# Patient Record
Sex: MALE | Race: WHITE | Employment: OTHER | URBAN - METROPOLITAN AREA
[De-identification: names, ages, dates, MRNs, and addresses within clinical notes are randomized per-mention and may not be internally consistent; named-entity substitution may affect disease eponyms.]

---

## 2017-07-07 DIAGNOSIS — M65.849: Primary | ICD-10-CM

## 2017-07-07 DIAGNOSIS — M65.839: Primary | ICD-10-CM

## 2017-07-19 ENCOUNTER — CLINICAL SUPPORT (OUTPATIENT)
Dept: REHABILITATION | Facility: HOSPITAL | Age: 37
End: 2017-07-19
Attending: ORTHOPAEDIC SURGERY
Payer: MEDICAID

## 2017-07-19 DIAGNOSIS — R52 PAIN: ICD-10-CM

## 2017-07-19 PROCEDURE — 97161 PT EVAL LOW COMPLEX 20 MIN: CPT | Performed by: PHYSICAL THERAPIST

## 2017-07-19 PROCEDURE — 97140 MANUAL THERAPY 1/> REGIONS: CPT | Performed by: PHYSICAL THERAPIST

## 2017-07-19 NOTE — PROGRESS NOTES
Physician:Caleb Trevino III*  Diagnosis: B forearm pain  Encounter Diagnosis   Name Primary?   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  He rates pain as 0/10 presently, 5/10 with activity.  States no prior treatment.    Chief complaint:  pain  Radicular symptoms:  none  Aggravating factors:   Gripping things  Easing factors:  rest     Current functional status:   Independent with ADL    Patients structured exercise routine:    none  Exercise routine prior to onset :     Wt-lifting    Special tests:    MRI:    none  Xray:    neg    Past treatment includes:  accupuncture    Work:     lucero                             Pts goals:  Decrease pain; full return to wt-lifting    OBJECTIVE:  Postural examination:  Good sitting posture     Functional assessment:   - walking:   independent             AROM:  WNL     MMT:   4+/5 wrist flexors, 5/5 others B elbow/wrist    Tone:  normal    Flexibility testing:   Tight wrist flexors    Special tests:   Neg elbow varus/valgus stresses    Palpation:   TTP B wrist flexors    Joint mobility: good    Swelling:  none    Other: sensation intact to light touch      Patient History Examination Clinical Presentation Clinical Decision Making   Comorbidities:      Personal Factors:         Activity and Participation Restriction:      Body Systems:  musculoskeletal      Body Regions:  B forearm Stable and uncomplicated     Low        FOTO:  40% limitation         TREATMENT:    Today's treatment:  HP B forearms x 10 min, manual wrist flex/ext stretch, STM with roller B wrist flexors x 3 min, HEP and DN B FCRL.    Pt was provided with a written copy of exercises to perform as tolerated, including: wrist flex/ext stretch and theraband wrist flex/ext/RD.    Exercises were reviewed and pt was able to  demonstrate them prior to the end of the session.     Pt was provided educational information, including: icing for pain.    Discussed insurance limitations with pt.     ASSESSMENT:  PT diagnosis: B wrist flexor strains   Patient can benefit from outpatient physical therapy and a home program  Prognosis is Good.    No cultural, environmental or spiritual barriers identified to treatment or learning.     Medical necessity is demonstrated by the following  IMPAIRMENTS:  Pain limits function of effected part for some activities    GOALS:    4_   weeks. Pt agrees with goals set.  1. Independent with HEP.  2. Report decreased    B forearm    pain  <   / =  3/10 with adls such as lifting  3. Increased MMT  for  B forearm to 5/5 with ADL    4.     PLAN:  Outpatient physical therapy 1-2 times weekly to include: pt ed, hep, therapeutic exercises, neuromuscular re-education/ balance exercises, joint mobilizations, modalities prn, and aquatic therapy.    Cont PT for  4         weeks.   I certify the need for these services   furnished under this plan of treatment and while under my   care.____________________________________ Physician/Referring Practitioner Date   of Signature

## 2017-08-02 ENCOUNTER — CLINICAL SUPPORT (OUTPATIENT)
Dept: REHABILITATION | Facility: HOSPITAL | Age: 37
End: 2017-08-02
Attending: ORTHOPAEDIC SURGERY
Payer: MEDICAID

## 2017-08-02 DIAGNOSIS — R52 PAIN: Primary | ICD-10-CM

## 2017-08-02 PROCEDURE — 97140 MANUAL THERAPY 1/> REGIONS: CPT | Performed by: PHYSICAL THERAPIST

## 2017-08-02 PROCEDURE — 97110 THERAPEUTIC EXERCISES: CPT | Performed by: PHYSICAL THERAPIST

## 2017-08-02 NOTE — PROGRESS NOTES
" Outpatient Physical Therapy Progress Note     Time in: 3:30  Time out: 4:00  Ther ex time: 15 min    Visit # 2    Subjective:  Pt states forearms feel "better overall" and rates pain as 0/10 presently.  States doing HEP as instructed.    Objective:   Mild TTP B wrist flexors.    Treatment:  There ex and modalities for increased ROM/strength and improved ADL to include:  HP x 10 min B elbows, manual wrist flexor/extensor stretch, STM with roller B wrist flexors, DN B FCRL and HEP review.     Assessment:   Sx decreased.  No TTP after DN.     Will the patient continue to benefit from skilled PT intervention?   yes      Medical necessity is demonstrated by:   Pain limits function of effected part for all activities  Unable to participate fully in daily activities      Progress towards goals: fair    New/Revised Goals:    Plan:  Continue with established Plan of Care toward PT goals.        "

## 2025-06-18 ENCOUNTER — OFFICE VISIT (OUTPATIENT)
Dept: SPORTS MEDICINE | Facility: CLINIC | Age: 45
End: 2025-06-18
Payer: MEDICAID

## 2025-06-18 ENCOUNTER — HOSPITAL ENCOUNTER (OUTPATIENT)
Dept: RADIOLOGY | Facility: HOSPITAL | Age: 45
Discharge: HOME | End: 2025-06-18
Attending: PHYSICIAN ASSISTANT
Payer: MEDICAID

## 2025-06-18 VITALS
DIASTOLIC BLOOD PRESSURE: 76 MMHG | HEIGHT: 73 IN | BODY MASS INDEX: 26.19 KG/M2 | SYSTOLIC BLOOD PRESSURE: 124 MMHG | HEART RATE: 64 BPM | WEIGHT: 197.63 LBS

## 2025-06-18 DIAGNOSIS — M25.562 LEFT KNEE PAIN, UNSPECIFIED CHRONICITY: ICD-10-CM

## 2025-06-18 DIAGNOSIS — M23.92 INTERNAL DERANGEMENT OF LEFT KNEE: Primary | ICD-10-CM

## 2025-06-18 PROCEDURE — 99203 OFFICE O/P NEW LOW 30 MIN: CPT | Mod: PBBFAC,25 | Performed by: PHYSICIAN ASSISTANT

## 2025-06-18 PROCEDURE — 3008F BODY MASS INDEX DOCD: CPT | Mod: CPTII,,, | Performed by: PHYSICIAN ASSISTANT

## 2025-06-18 PROCEDURE — 99204 OFFICE O/P NEW MOD 45 MIN: CPT | Mod: S$PBB,,, | Performed by: PHYSICIAN ASSISTANT

## 2025-06-18 PROCEDURE — 99999 PR PBB SHADOW E&M-NEW PATIENT-LVL III: CPT | Mod: PBBFAC,,, | Performed by: PHYSICIAN ASSISTANT

## 2025-06-18 PROCEDURE — 1160F RVW MEDS BY RX/DR IN RCRD: CPT | Mod: CPTII,,, | Performed by: PHYSICIAN ASSISTANT

## 2025-06-18 PROCEDURE — 73564 X-RAY EXAM KNEE 4 OR MORE: CPT | Mod: TC,50

## 2025-06-18 PROCEDURE — 73564 X-RAY EXAM KNEE 4 OR MORE: CPT | Mod: 26,50,, | Performed by: RADIOLOGY

## 2025-06-18 PROCEDURE — 3074F SYST BP LT 130 MM HG: CPT | Mod: CPTII,,, | Performed by: PHYSICIAN ASSISTANT

## 2025-06-18 PROCEDURE — 3078F DIAST BP <80 MM HG: CPT | Mod: CPTII,,, | Performed by: PHYSICIAN ASSISTANT

## 2025-06-18 PROCEDURE — 1159F MED LIST DOCD IN RCRD: CPT | Mod: CPTII,,, | Performed by: PHYSICIAN ASSISTANT

## 2025-06-18 RX ORDER — MELOXICAM 15 MG/1
15 TABLET ORAL DAILY
Qty: 30 TABLET | Refills: 2 | Status: SHIPPED | OUTPATIENT
Start: 2025-06-18

## 2025-06-18 NOTE — PROGRESS NOTES
Subjective:     Chief Complaint: Enoc Rodney is a 44 y.o. male who had concerns including Pain of the Left Knee and Pain.    History of Present Illness    CHIEF COMPLAINT:  - Left knee pain    HPI:  Enoc presents with left knee pain that started approximately two weeks ago following a twisting injury. He felt a pop in his knee, which he describes as a sudden, intense sensation. Pain is primarily on the inner (medial) side of the knee. Initially, he had minimal discomfort, but pain increased after returning from a vacation to [place]. He reports difficulty moving his leg after sitting for prolonged periods, experiencing pain when initiating movement. He has significant locking catching sensations in the knee especially sleeping.    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 Since the injury, he has avoided leg exercises but has continued upper body workouts. He was able to swim in the ocean during his vacation, using a dolphin kick to avoid aggravating his knee. To manage his pain, he has been using naproxen and was given toradol by a friend, which he reports provided some relief. He visited his chiropractor who has noam facilitating his therapy exercises but pain worse.     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 He denies any formal medical diagnoses.    PREVIOUS TREATMENTS:  - Enoc took naproxen for knee pain  - Enoc took toradol for knee pain  - Enoc has been to the gym twice to do upper body exercises since the injury  - Enoc swam a little bit in the ocean during vacation, using dolphin kick but avoiding breaststroke, pain with swimming      MEDICATIONS:  - Meloxicam: 15 mg 1 tablet daily (prescribed in 2013, available for current use if needed)  - Naproxen  - Toradol    WORK STATUS:  - Enoc works in construction, effecting work  - Job involves frequent squatting  - Enoc reports  some knee pain during work activities, ADLs      ROS:  General: negative fever, negative chills, negative fatigue, negative weight gain, negative weight loss  Eyes: negative vision changes, negative redness, negative discharge  ENT: negative ear pain, negative nasal congestion, negative sore throat  Cardiovascular: negative chest pain, negative palpitations, negative lower extremity edema  Respiratory: negative cough, negative shortness of breath  Gastrointestinal: negative abdominal pain, negative nausea, negative vomiting, negative diarrhea, negative constipation, negative blood in stool  Genitourinary: negative dysuria, negative hematuria, negative frequency  Musculoskeletal: +joint pain, negative muscle pain, +joint stiffness, +pain with movement  Skin: negative rash, negative lesion  Neurological: negative headache, negative dizziness, negative numbness, negative tingling  Psychiatric: negative anxiety, negative depression, negative sleep difficulty           Pain Related Questions  Over the past 3 days, what was your average pain during activity? (I.e. running, jogging, walking, climbing stairs, getting dressed, ect.): 10  Over the past 3 days, what was your highest pain level?: 10  Over the past 3 days, what was your lowest pain level? : 4    Other  How many nights a week are you awakened by your affected body part?: 3  Was the patient's HEIGHT measured or patient reported?: Patient Reported  Was the patient's WEIGHT measured or patient reported?: Measured    Past Medical History[1]     Past Surgical History:   Procedure Laterality Date    SINUS SURGERY      thumb surgery         Objective:     General: Enoc is well-developed, well-nourished, appears stated age, in no acute distress, alert and oriented to time, place and person.     General    Nursing note and vitals reviewed.  Constitutional: He is oriented to person, place, and time. He appears well-developed and well-nourished. No distress.   Eyes:  Conjunctivae and EOM are normal. Pupils are equal, round, and reactive to light.   Neck: Neck supple. No JVD present.   Cardiovascular:  Normal heart sounds and intact distal pulses.            No murmur heard.  Pulmonary/Chest: Effort normal and breath sounds normal. No respiratory distress.   Abdominal: Soft. Bowel sounds are normal. He exhibits no distension. There is no abdominal tenderness.   Neurological: He is alert and oriented to person, place, and time. Coordination normal.   Psychiatric: He has a normal mood and affect. His behavior is normal. Judgment and thought content normal.     General Musculoskeletal Exam   Gait: normal       Right Knee Exam     Inspection   Erythema: absent  Scars: absent  Swelling: absent  Effusion: absent  Deformity: absent  Bruising: absent    Range of Motion   Extension:  0   Flexion:  130     Tests   Meniscus   Shawn:  Medial - negative Lateral - negative  Ligament Examination   Lachman: normal (-1 to 2mm)   PCL-Posterior Drawer: normal (0 to 2mm)     MCL - Valgus: normal (0 to 2mm)  LCL - Varus: normal  Dial Test at 90 degrees: normal (< 5 degrees)  Posterolateral Corner: stable  Patella   Patellar Glide (quadrants): Lateral - 1   Medial - 2  Patellar Grind: negative    Other   Popliteal (Baker's) Cyst: absent  Sensation: normal    Left Knee Exam     Inspection   Erythema: absent  Scars: absent  Swelling: absent  Effusion: absent  Deformity: absent  Bruising: absent    Tenderness   The patient tender to palpation of the medial joint line.    Range of Motion   Extension:  0 (+pain) abnormal   Flexion:  130 (+pain) abnormal     Tests   Meniscus   Shawn:  Medial - positive Lateral - negative  Stability   Lachman: normal (-1 to 2mm)   PCL-Posterior Drawer: normal (0 to 2mm)  MCL - Valgus: normal (0 to 2mm)  LCL - Varus: normal (0 to 2mm)  Dial Test at 90 degrees: normal (< 5 degrees)  Posterolateral Corner: stable  Patella   Patellar Glide (Quadrants): Lateral - 1 Medial -  2  Patellar Grind: negative    Other   Popliteal (Baker's) Cyst: absent  Sensation: normal    Comments:  + squat test  +TTP at medial joint line with terminal flexion and terminal extension      Right Hip Exam     Tests   Red: negative  Left Hip Exam     Tests   Red: negative          Muscle Strength   Right Lower Extremity   Hip Abduction: 5/5   Quadriceps:  5/5   Hamstrin/5   Left Lower Extremity   Hip Abduction: 5/5   Quadriceps:  5/5   Hamstrin/5     Vascular Exam     Right Pulses  Dorsalis Pedis:      2+  Posterior Tibial:      2+        Left Pulses  Dorsalis Pedis:      2+  Posterior Tibial:      2+        Edema  Right Lower Leg: absent  Left Lower Leg: absent          Radiographic findings:    Mild medial compartment joint space narrowing. Otherwise, joint spaces and alignment are maintained. No acute fracture, dislocation or osseous destructive process. No large knee joint effusion.      Kellgren-Quintin : 2     These findings were discussed and reviewed with the patient.     Assessment:     Encounter Diagnoses   Name Primary?    Internal derangement of left knee Yes    Left knee pain, unspecified chronicity         Plan:     We have discussed a variety of treatment options including medications, injections, physical therapy and other alternative treatments. I also explained the indications, risks and benefits of surgery. Given the patient's hx and examination, and failed attempts of rehab, we should proceed with MRI at this time. Pt agrees with treatment plan.    I made the decision to obtain old records of the patient including previous notes and imaging. I independently reviewed and interpreted lab results today as well as prior imaging.     1. MRI left knee to assess for medial meniscus and cartilage pathology.      Assessment & Plan        MEDICATIONS:  1. Meloxicam 15 mg PO daily with food for pain.    IMAGIN. Ordered MRI Left Knee to evaluate for meniscus tear and assess cartilage  condition.    FOLLOW UP:  1. Follow up after MRI results are available.  2. Will call patient to discuss MRI results and treatment plan.           All of the patient's questions were answered and the patient will contact us if they have any questions or concerns in the interim.    This note was generated with the assistance of ambient listening technology. Verbal consent was obtained by the patient and accompanying visitor(s) for the recording of patient appointment to facilitate this note. I attest to having reviewed and edited the generated note for accuracy, though some syntax or spelling errors may persist. Please contact the author of this note for any clarification.           [1] No past medical history on file.

## 2025-06-28 ENCOUNTER — HOSPITAL ENCOUNTER (OUTPATIENT)
Dept: RADIOLOGY | Facility: HOSPITAL | Age: 45
Discharge: HOME | End: 2025-06-28
Attending: PHYSICIAN ASSISTANT
Payer: MEDICAID

## 2025-06-28 DIAGNOSIS — M23.92 INTERNAL DERANGEMENT OF LEFT KNEE: ICD-10-CM

## 2025-06-28 DIAGNOSIS — M25.562 LEFT KNEE PAIN, UNSPECIFIED CHRONICITY: ICD-10-CM

## 2025-06-28 PROCEDURE — 73721 MRI JNT OF LWR EXTRE W/O DYE: CPT | Mod: TC,LT

## 2025-06-28 PROCEDURE — 73721 MRI JNT OF LWR EXTRE W/O DYE: CPT | Mod: 26,LT,, | Performed by: RADIOLOGY

## 2025-06-30 ENCOUNTER — RESULTS FOLLOW-UP (OUTPATIENT)
Dept: SPORTS MEDICINE | Facility: CLINIC | Age: 45
End: 2025-06-30

## 2025-07-01 ENCOUNTER — OFFICE VISIT (OUTPATIENT)
Dept: SPORTS MEDICINE | Facility: CLINIC | Age: 45
End: 2025-07-01
Payer: MEDICAID

## 2025-07-01 VITALS
WEIGHT: 197.75 LBS | SYSTOLIC BLOOD PRESSURE: 131 MMHG | HEART RATE: 76 BPM | DIASTOLIC BLOOD PRESSURE: 71 MMHG | HEIGHT: 73 IN | BODY MASS INDEX: 26.21 KG/M2

## 2025-07-01 DIAGNOSIS — S83.232A COMPLEX TEAR OF MEDIAL MENISCUS OF LEFT KNEE, UNSPECIFIED WHETHER OLD OR CURRENT TEAR, INITIAL ENCOUNTER: Primary | ICD-10-CM

## 2025-07-01 PROCEDURE — 3008F BODY MASS INDEX DOCD: CPT | Mod: CPTII,,, | Performed by: ORTHOPAEDIC SURGERY

## 2025-07-01 PROCEDURE — 99214 OFFICE O/P EST MOD 30 MIN: CPT | Mod: S$PBB,,, | Performed by: ORTHOPAEDIC SURGERY

## 2025-07-01 PROCEDURE — 99213 OFFICE O/P EST LOW 20 MIN: CPT | Mod: PBBFAC | Performed by: ORTHOPAEDIC SURGERY

## 2025-07-01 PROCEDURE — 1159F MED LIST DOCD IN RCRD: CPT | Mod: CPTII,,, | Performed by: ORTHOPAEDIC SURGERY

## 2025-07-01 PROCEDURE — 99999 PR PBB SHADOW E&M-EST. PATIENT-LVL III: CPT | Mod: PBBFAC,,, | Performed by: ORTHOPAEDIC SURGERY

## 2025-07-01 PROCEDURE — 3075F SYST BP GE 130 - 139MM HG: CPT | Mod: CPTII,,, | Performed by: ORTHOPAEDIC SURGERY

## 2025-07-01 PROCEDURE — 3078F DIAST BP <80 MM HG: CPT | Mod: CPTII,,, | Performed by: ORTHOPAEDIC SURGERY

## 2025-07-01 NOTE — PROGRESS NOTES
Subjective:     Chief Complaint: Enoc Rodney is a 44 y.o. male who had concerns including Pain of the Left Knee and Follow-up.    History of Present Illness    CHIEF COMPLAINT:  - Left knee pain    44 y.o. male presents for MRI review of left knee; referral from Nam Haas PA-C.  Concern for meniscus tear. Patient does not report any new incidents or injuries since their last appointment. Pain and symptoms remain unchanged since his last appointment. Here today to discuss treatment options.       Interval Hx:   Enoc presents with left knee pain that started approximately two weeks ago following a twisting injury. He felt a pop in his knee, which he describes as a sudden, intense sensation. Pain is primarily on the inner (medial) side of the knee. Initially, he had minimal discomfort, but pain increased after returning from a vacation to [place]. He reports difficulty moving his leg after sitting for prolonged periods, experiencing pain when initiating movement. He has significant locking catching sensations in the knee especially sleeping.    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 Since the injury, he has avoided leg exercises but has continued upper body workouts. He was able to swim in the ocean during his vacation, using a dolphin kick to avoid aggravating his knee. To manage his pain, he has been using naproxen and was given toradol by a friend, which he reports provided some relief. He visited his chiropractor who has noam facilitating his therapy exercises but pain worse.     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 He denies any formal medical diagnoses.    PREVIOUS TREATMENTS:  - Enoc took naproxen for knee pain  - Enoc took toradol for knee pain  - Enoc has been to the gym twice to do upper body exercises since the injury  - Enoc swam a little bit in the ocean during  vacation, using dolphin kick but avoiding breaststroke, pain with swimming      MEDICATIONS:  - Meloxicam: 15 mg 1 tablet daily (prescribed in 2013, available for current use if needed)  - Naproxen  - Toradol    WORK STATUS:  - Enoc works in construction, effecting work  - Job involves frequent squatting  - Enoc reports some knee pain during work activities, ADLs      ROS:  General: negative fever, negative chills, negative fatigue, negative weight gain, negative weight loss  Eyes: negative vision changes, negative redness, negative discharge  ENT: negative ear pain, negative nasal congestion, negative sore throat  Cardiovascular: negative chest pain, negative palpitations, negative lower extremity edema  Respiratory: negative cough, negative shortness of breath  Gastrointestinal: negative abdominal pain, negative nausea, negative vomiting, negative diarrhea, negative constipation, negative blood in stool  Genitourinary: negative dysuria, negative hematuria, negative frequency  Musculoskeletal: +joint pain, negative muscle pain, +joint stiffness, +pain with movement  Skin: negative rash, negative lesion  Neurological: negative headache, negative dizziness, negative numbness, negative tingling  Psychiatric: negative anxiety, negative depression, negative sleep difficulty           Pain Related Questions  Over the past 3 days, what was your average pain during activity? (I.e. running, jogging, walking, climbing stairs, getting dressed, ect.): 1  Over the past 3 days, what was your highest pain level?: 1  Over the past 3 days, what was your lowest pain level? : 1    Other  How many nights a week are you awakened by your affected body part?: 0  Was the patient's HEIGHT measured or patient reported?: Patient Reported  Was the patient's WEIGHT measured or patient reported?: Measured    Past Medical History[1]     Past Surgical History:   Procedure Laterality Date    SINUS SURGERY      thumb surgery         Objective:      General: Enoc is well-developed, well-nourished, appears stated age, in no acute distress, alert and oriented to time, place and person.     General    Nursing note and vitals reviewed.  Constitutional: He is oriented to person, place, and time. He appears well-developed and well-nourished. No distress.   Eyes: Conjunctivae and EOM are normal. Pupils are equal, round, and reactive to light.   Neck: Neck supple. No JVD present.   Cardiovascular:  Normal heart sounds and intact distal pulses.            No murmur heard.  Pulmonary/Chest: Effort normal and breath sounds normal. No respiratory distress.   Abdominal: Soft. Bowel sounds are normal. He exhibits no distension. There is no abdominal tenderness.   Neurological: He is alert and oriented to person, place, and time. Coordination normal.   Psychiatric: He has a normal mood and affect. His behavior is normal. Judgment and thought content normal.     General Musculoskeletal Exam   Gait: normal       Right Knee Exam     Inspection   Erythema: absent  Scars: absent  Swelling: absent  Effusion: absent  Deformity: absent  Bruising: absent    Range of Motion   Extension:  0   Flexion:  130     Tests   Meniscus   Shawn:  Medial - negative Lateral - negative  Ligament Examination   Lachman: normal (-1 to 2mm)   PCL-Posterior Drawer: normal (0 to 2mm)     MCL - Valgus: normal (0 to 2mm)  LCL - Varus: normal  Dial Test at 90 degrees: normal (< 5 degrees)  Posterolateral Corner: stable  Patella   Patellar Glide (quadrants): Lateral - 1   Medial - 2  Patellar Grind: negative    Other   Popliteal (Baker's) Cyst: absent  Sensation: normal    Left Knee Exam     Inspection   Erythema: absent  Scars: absent  Swelling: absent  Effusion: absent  Deformity: absent  Bruising: absent    Tenderness   The patient tender to palpation of the medial joint line.    Range of Motion   Extension:  0 (+pain) abnormal   Flexion:  130 (+pain) abnormal     Tests   Meniscus   Shawn:   Medial - positive Lateral - negative  Stability   Lachman: normal (-1 to 2mm)   PCL-Posterior Drawer: normal (0 to 2mm)  MCL - Valgus: normal (0 to 2mm)  LCL - Varus: normal (0 to 2mm)  Dial Test at 90 degrees: normal (< 5 degrees)  Posterolateral Corner: stable  Patella   Patellar Glide (Quadrants): Lateral - 1 Medial - 2  Patellar Grind: negative    Other   Popliteal (Baker's) Cyst: absent  Sensation: normal    Comments:  + squat test  +TTP at medial joint line with terminal flexion and terminal extension      Right Hip Exam     Tests   Red: negative  Left Hip Exam     Tests   Red: negative          Muscle Strength   Right Lower Extremity   Hip Abduction: 5/5   Quadriceps:  5/5   Hamstrin/5   Left Lower Extremity   Hip Abduction: 5/5   Quadriceps:  5/5   Hamstrin/5     Vascular Exam     Right Pulses  Dorsalis Pedis:      2+  Posterior Tibial:      2+        Left Pulses  Dorsalis Pedis:      2+  Posterior Tibial:      2+        Edema  Right Lower Leg: absent  Left Lower Leg: absent          Radiographic findings:  Results for orders placed or performed during the hospital encounter of 25 (from the past 2160 hours)   MRI Knee Without Contrast Left    Impression    Horizontal tear posterior horn body segment medial meniscus      Electronically signed by: Ra Dillon MD  Date:    2025  Time:    08:52         Mild medial compartment joint space narrowing. Otherwise, joint spaces and alignment are maintained. No acute fracture, dislocation or osseous destructive process. No large knee joint effusion.      Kellgren-Quintin : 2     These findings were discussed and reviewed with the patient.     Assessment:     Encounter Diagnosis   Name Primary?    Complex tear of medial meniscus of left knee, unspecified whether old or current tear, initial encounter Yes          Plan:     Treatment options were discussed with the patient about his knee.  I reviewed the MRI images with his, including the  relevant anatomy, and what this means for his knee.    We discussed both non-operative and operative options for his knee and the risks and benefits of each.    I feel like he would benefit from surgery for his knee.  After a discussion of specific risks and benefits, he would like to proceed with setting this up.    These risks include: bleeding, infection, scarring, damage to neurovascular structures, damage to cartilage, stiffness, blood clots, pulmonary embolism, swelling, compartment syndrome, need for further surgery, and the risks of anesthesia.      He verbalized his understanding of these risks and wished to proceed with surgery.    A total of 25 minutes were spent face-to-face with the patient during this encounter and over half of that time was spent on counseling about treatment options including his choice for surgery and coordination of his care for his preoperative visits, surgery and post-operative rehab.  We also had a detailed discussion of his expectation level for this procedure as well as any limitations at home or work and with exercising following surgery.     The operative plan is:    left   1. Arthroscopic partial meniscectomy  2. Possible arthroscopic synovectomy  3. Possible arthroscopic loose body removal  4. Possible arthroscopic chondroplasty    Position: Supine    Patient will not need medical clearance prior to the pre-operative appointment.    If he wishes to proceed, we'll get his scheduled for this at his convenience around his work schedule.        All of the patient's questions were answered and the patient will contact us if they have any questions or concerns in the interim.               [1] No past medical history on file.

## 2025-07-02 DIAGNOSIS — S83.232A COMPLEX TEAR OF MEDIAL MENISCUS OF LEFT KNEE, UNSPECIFIED WHETHER OLD OR CURRENT TEAR, INITIAL ENCOUNTER: Primary | ICD-10-CM

## 2025-07-03 ENCOUNTER — PATIENT MESSAGE (OUTPATIENT)
Dept: PREADMISSION TESTING | Facility: HOSPITAL | Age: 45
End: 2025-07-03
Payer: MEDICAID

## 2025-07-03 NOTE — ANESTHESIA PAT ROS NOTE
07/03/2025  Enoc Rodney is a 44 y.o., male.      Pre-op Assessment    I have reviewed the Patient Summary Reports.       I have reviewed the Medications.     Review of Systems  Anesthesia Hx:    Past anesthesia record not available              Social:  Former Smoker, Social Alcohol Use       Hematology/Oncology:  Hematology Normal   Oncology Normal                                   EENT/Dental:   H/O sinus surgery          Cardiovascular:  Cardiovascular Normal Exercise tolerance: good       Denies CABG/stent.    Denies Angina.       Denies LIRIANO.    Patient not on beta blockers                          Pulmonary:  Pulmonary Normal   Denies COPD.  Denies Asthma.   Denies Shortness of breath.                  Renal/:  Renal/ Normal                 Hepatic/GI:  Hepatic/GI Normal     Denies GERD.    Not Taking GLP-1 Agonists            Musculoskeletal:     Complex tear of medial meniscus of left knee,             Neurological:  Neurology Normal      Denies Seizures.                                Endocrine:  Endocrine Normal Denies Diabetes. Denies Hypothyroidism.          Psych:  Psychiatric Normal                   No past medical history on file.  Past Surgical History:   Procedure Laterality Date    SINUS SURGERY      thumb surgery         Anesthesia Assessment: Preoperative EQUATION    Planned Procedure: Procedure(s) (LRB):  ARTHROSCOPY, KNEE, W/ MENISCECTOMY (Left)  ARTHROSCOPY, KNEE, W/ MENISCUS REPAIR (Left)  Requested Anesthesia Type:General  Surgeon: José Miguel Del Rosario MD  Service: Orthopedics  Known or anticipated Date of Surgery:7/14/2025    Surgeon notes: reviewed    Electronic QUestionnaire Assessment completed via nurse interview with patient.        Triage considerations:     The patient has no apparent active cardiac condition (No unstable coronary Syndrome such as severe unstable angina  or recent [<1 month] myocardial infarction, decompensated CHF, severe valvular   disease or significant arrhythmia)    Previous anesthesia records:Not available    Last PCP note: outside [facility]   Subspecialty notes: n/a    Other important co-morbidities: GILBERT      Tests already available:  No recent tests.             Instructions given. (See in Nurse's note) Preop medication instructions sent via portal message.     Optimization:  Anesthesia Preop Clinic Assessment Not Indicated    Medical Opinion Indicated: No       Sub-specialist consult indicated: No      Plan: Consultation: Medical clearance is not requested.         Navigation:  Straight Line to surgery.               No tests, anesthesia preop clinic visit, or consult required.                        Patient is OK to proceed with surgery at [facility].        Ht: 6'1  Wt: 89.7 kg (197 lb)  BMI: 26.09

## 2025-07-10 ENCOUNTER — OFFICE VISIT (OUTPATIENT)
Dept: SPORTS MEDICINE | Facility: CLINIC | Age: 45
End: 2025-07-10
Payer: MEDICAID

## 2025-07-10 VITALS
DIASTOLIC BLOOD PRESSURE: 69 MMHG | BODY MASS INDEX: 26.21 KG/M2 | HEIGHT: 73 IN | SYSTOLIC BLOOD PRESSURE: 127 MMHG | HEART RATE: 77 BPM | WEIGHT: 197.75 LBS

## 2025-07-10 DIAGNOSIS — S83.232A COMPLEX TEAR OF MEDIAL MENISCUS OF LEFT KNEE, UNSPECIFIED WHETHER OLD OR CURRENT TEAR, INITIAL ENCOUNTER: Primary | ICD-10-CM

## 2025-07-10 PROCEDURE — 99213 OFFICE O/P EST LOW 20 MIN: CPT | Mod: PBBFAC | Performed by: PHYSICIAN ASSISTANT

## 2025-07-10 PROCEDURE — 99499 UNLISTED E&M SERVICE: CPT | Mod: S$PBB,,, | Performed by: PHYSICIAN ASSISTANT

## 2025-07-10 PROCEDURE — 99999 PR PBB SHADOW E&M-EST. PATIENT-LVL III: CPT | Mod: PBBFAC,,, | Performed by: PHYSICIAN ASSISTANT

## 2025-07-10 RX ORDER — TRAMADOL HYDROCHLORIDE 50 MG/1
50 TABLET, FILM COATED ORAL EVERY 6 HOURS PRN
Qty: 20 TABLET | Refills: 0 | Status: SHIPPED | OUTPATIENT
Start: 2025-07-10

## 2025-07-10 RX ORDER — HYDROCODONE BITARTRATE AND ACETAMINOPHEN 7.5; 325 MG/1; MG/1
1 TABLET ORAL EVERY 6 HOURS PRN
Qty: 20 TABLET | Refills: 0 | Status: SHIPPED | OUTPATIENT
Start: 2025-07-10

## 2025-07-10 RX ORDER — NAPROXEN SODIUM 220 MG/1
81 TABLET, FILM COATED ORAL DAILY
Qty: 21 TABLET | Refills: 0 | Status: SHIPPED | OUTPATIENT
Start: 2025-07-10 | End: 2025-08-01

## 2025-07-10 RX ORDER — ASPIRIN 325 MG
325 TABLET ORAL DAILY
Qty: 21 TABLET | Refills: 0 | Status: CANCELLED | OUTPATIENT
Start: 2025-07-10 | End: 2025-07-31

## 2025-07-10 RX ORDER — SODIUM CHLORIDE 9 MG/ML
INJECTION, SOLUTION INTRAVENOUS CONTINUOUS
OUTPATIENT
Start: 2025-07-10

## 2025-07-10 RX ORDER — PROMETHAZINE HYDROCHLORIDE 25 MG/1
25 TABLET ORAL EVERY 6 HOURS PRN
Qty: 20 TABLET | Refills: 0 | Status: SHIPPED | OUTPATIENT
Start: 2025-07-10

## 2025-07-10 NOTE — H&P (VIEW-ONLY)
Enoc Rodney  is here for a completion of his perioperative paperwork. he  Is scheduled to undergo:    left   1. Arthroscopic partial meniscectomy versus repair  2. Possible arthroscopic synovectomy  3. Possible arthroscopic loose body removal  4. Possible arthroscopic chondroplasty     on 7/14/2025.      He is a healthy individual and does not need clearance for this procedure.     PAST MEDICAL HISTORY: History reviewed. No pertinent past medical history.  PAST SURGICAL HISTORY:   Past Surgical History:   Procedure Laterality Date    SINUS SURGERY      thumb surgery       FAMILY HISTORY: No family history on file.  SOCIAL HISTORY: Social History[1]    MEDICATIONS: Current Medications[2]  ALLERGIES: Review of patient's allergies indicates:  No Known Allergies    VITAL SIGNS: There were no vitals taken for this visit.     Risks, indications and benefits of the surgical procedure were discussed with the patient. All questions with regard to surgery, rehab, expected return to functional activities, activities of daily living and recreational endeavors were answered to his satisfaction.    It was explained to the patient that there may be an increase in surgical risks if the patient has certain co-morbidities such as but not limited to: Obesity, Cardiovascular issues (CHF, CAD, Arrhythmias), chronic pulmonary issues, previous or current neurovascular/neurological issues, previous strokes, diabetes mellitus, previous wound healing issues, previous wound or skin infections, PVD, clotting disorders, if the patient uses chronic steroids, if the patient takes or has immune compromising medications or diseases, or has previously or currently used tobacco products.     The patient verbalized that he/she does not have any additional clotting, bleeding, or blood disorders, other than what is list in her chart on today's review.     Then a brief history and physical exam were performed.    Review of Systems   Constitution:  Negative. Negative for chills, fever and night sweats.   HENT: Negative for congestion and headaches.    Eyes: Negative for blurred vision, left vision loss and right vision loss.   Cardiovascular: Negative for chest pain and syncope.   Respiratory: Negative for cough and shortness of breath.    Endocrine: Negative for polydipsia, polyphagia and polyuria.   Hematologic/Lymphatic: Negative for bleeding problem. Does not bruise/bleed easily.   Skin: Negative for dry skin, itching and rash.   Musculoskeletal: Negative for falls and muscle weakness.   Gastrointestinal: Negative for abdominal pain and bowel incontinence.   Genitourinary: Negative for bladder incontinence and nocturia.   Neurological: Negative for disturbances in coordination, loss of balance and seizures.   Psychiatric/Behavioral: Negative for depression. The patient does not have insomnia.    Allergic/Immunologic: Negative for hives and persistent infections.     PHYSICAL EXAM:  GEN: A&Ox3, WD WN NAD  HEENT: WNL  CHEST: CTAB, no W/R/R  HEART: RRR, no M/R/G  ABD: Soft, NT ND, BS x4 QUADS  MS; See Epic  NEURO: CN II-XII intact       The surgical consent was then reviewed with the patient, who agreed with all the contents of the consent form and it was signed. he was then given the [facility] surgery packet to bring with him to surgery for the anesthesia portion of his perioperative paperwork.   For all physicians except for Dr. Del Rosario, we will email and possibly fax the consent forms and booking sheets to [facility] pre-admit.    The patient was given the opportunity to ask questions about the surgical plan and consent form, and once no other questions were asked, I proceeded with the pre-op appointment.    PHYSICAL THERAPY:  He was also instructed regarding physical therapy and will begin on POD#1 at the [facility] location.     POST OP CARE:instructions were reviewed including care of the wound and dressing after surgery and  when he can shower.     CRUTCHES OR WALKER: It was explained to the patient that if they are having a lower extremity surgery that they will require either a walker or crutches to ambulate safely with after surgery. It was explained that a cane or other assistive devices are not sufficient to safely ambulate with after surgery. I explained to the patient that I will place an order for them to receive either crutches or a walker after surgery to go home with. It was explained that if they have crutches or a walker at home already, that they are REQUIRED to bring them to the hospital on the day of surgery. It was explained that if they do not have them at the hospital on the day of surgery that they WILL be provided a new pair or crutches or a walker to go home with to ensure ambulation will be safe if the patient needs to stop somewhere on the way home.      PAIN MANAGEMENT: Enoc Rodney was also given their pain management regimen, which includes the TENS unit given to him by [facility] along with the education required for its use.    PAIN MEDICATION:  Norco 7.5/325mg 1 po q 4-6 hours prn pain  Ultram 50 mg Take 1-2 p.o. q.6 hours p.r.n. breakthrough pain,   Phenergan 25 mg one p.o. q.6 hours p.r.n. nausea and vomiting.    Post op meds to be delivered bedside prior to discharge. Deliver to family if patient is in surgery at 5pm.    The patient was told that narcotic pain medications may make them drowsy and instructions were given to not sign legal documents, drive or operate heavy machinery, cars, or equipment while under the influence of narcotic medications. The patient was told and understands that narcotic pain medications should only be used as needed to control pain and that other options of pain control include TENs unit and ice packs/unit.     Patient was instructed to purchase and take Colace to counter possible GI side effects of taking opiates.     DVT prophylaxis was discussed with the patient today  including risk factors for developing DVTs and history of DVTs. The patient was asked if any specific recommendations were given from the doctor/s that did pre-operative surgical clearance. The patient was then given an education sheet about DVTs and PE with warning signs and symptoms of both and steps to take if they suspect either of these.    Patient was asked if they were taking or using OCP pills or devices. If they answered yes, then they were instructed to stop using OCPs at this pre-operative appointment until 2 months post-op to help prevent DVT development. They understand that there are other forms of birth control that do not involve hormones. They expressed understanding that ignoring/not following this instruction could result in a DVT which could turn into a deadly pulmonary embolism.     This along with the Modified Caprini risk assessment model for VTE in general surgical patients was used to determine the patient's DVT risk.     From: Joselito GARCIA, Kamran DA, Patricia OLIVERA, et al. Prevention of VTE in nonorthopedic surgical patients: antithrombotic therapy and prevention of thrombosis, 9th ed: American College of Chest Physicians evidence-based clinical practical guidelines. Chest 2012; 141:e227S. Copyright © 2012. Reproduced with permission from the American College of Chest Physicians.    The below listed DVT prophylaxis regimen was discussed along with SCDs during surgery and bilateral TABITHA compression stockings to be used post-op. Length of treatment has been determined to be 10-42 days post-op by the above noted Caprini assessment model. Early ambulation post-op was also discussed and emphasized with the patient.     Patient was instructed to buy and take:  Aspirin 81mg QD x 3 weeks for DVT prophylaxis starting on the evening after surgery.  Patient will also use bilateral TEDs on lower extremities, SCDs during surgery, and early ambulation post-op. If the patient was previously taking 81mg baby aspirin,  they were told to not take it will using the above stated aspirin and to restart the 81mg aspirin after completion of the aspirin dose.      Patient was also told to buy over the counter Prilosec medication and take it once daily for GI protection as long as they are taking NSAIDs or Aspirin.     Published data in Alexandra YEN et al. J Arthroplasty. Oct; 31(10):2237-40, 2016; showed that aspirin use as prophylaxis during revision total joint arthroplasty was more effective than warfarin in preventing symptomatic venous thromboembolic events and was associated with lower complications.  Patients in the study were also treated with intermittent pneumatic compression devices. Compression stockings would be our method of mechanical prophylaxis, which has been shown to be similar to pneumatic compression in the systematic review, Daniel RJ, et al. Roselyn Surg. Feb; 239(2): 162-171, 2004.     Results showed a significantly higher incidence of symptomatic venous thromboembolic events among patients in the warfarin group vs. the aspirin group (1.75% vs. 0.56%). Researchers also noted a bleeding event rate of 1.5% among patients who received warfarin compared with a rate of 0.4% among patients who received aspirin.    Patient denies history of seizures.     I explained to following and the patient expressed understanding:  The patient is currently aware of the COVID19 pandemic and that proceeding with their surgical procedure could potentially increase exposure to coronavirus in the community. The patient understands that there is the possibility of delayed or cancelled appts or PT visits in the future. They understand that infection with the coronavirus could complicate their surgery recovery. They are aware of the current policies and procedures of [facility] and the government regarding the pandemic and they were given the option of delaying my surgery. The patient elects to proceed with surgery at this time.     The patient  was instructed to practice strict social distancing, hand washing/hygiene, respiratory hygiene, and cough etiquette from now until 6 weeks following surgery to reduce the risk of jacky coronavirus.    As there were no other questions to be asked, he was given my business card along with José Miguel Del Rosario MD business card if he has any questions or concerns prior to surgery or in the postop period.          [1]   Social History  Socioeconomic History    Marital status: Single   Tobacco Use    Smoking status: Former    Smokeless tobacco: Never   Substance and Sexual Activity    Alcohol use: Yes    Drug use: No   [2]   Current Outpatient Medications:     meloxicam (MOBIC) 15 MG tablet, Take 1 tablet (15 mg total) by mouth once daily., Disp: 30 tablet, Rfl: 2

## 2025-07-10 NOTE — H&P
Enoc Rodney  is here for a completion of his perioperative paperwork. he  Is scheduled to undergo:    left   1. Arthroscopic partial meniscectomy versus repair  2. Possible arthroscopic synovectomy  3. Possible arthroscopic loose body removal  4. Possible arthroscopic chondroplasty     on 7/14/2025.      He is a healthy individual and does not need clearance for this procedure.     PAST MEDICAL HISTORY: History reviewed. No pertinent past medical history.  PAST SURGICAL HISTORY:   Past Surgical History:   Procedure Laterality Date    SINUS SURGERY      thumb surgery       FAMILY HISTORY: No family history on file.  SOCIAL HISTORY: Social History[1]    MEDICATIONS: Current Medications[2]  ALLERGIES: Review of patient's allergies indicates:  No Known Allergies    VITAL SIGNS: There were no vitals taken for this visit.     Risks, indications and benefits of the surgical procedure were discussed with the patient. All questions with regard to surgery, rehab, expected return to functional activities, activities of daily living and recreational endeavors were answered to his satisfaction.    It was explained to the patient that there may be an increase in surgical risks if the patient has certain co-morbidities such as but not limited to: Obesity, Cardiovascular issues (CHF, CAD, Arrhythmias), chronic pulmonary issues, previous or current neurovascular/neurological issues, previous strokes, diabetes mellitus, previous wound healing issues, previous wound or skin infections, PVD, clotting disorders, if the patient uses chronic steroids, if the patient takes or has immune compromising medications or diseases, or has previously or currently used tobacco products.     The patient verbalized that he/she does not have any additional clotting, bleeding, or blood disorders, other than what is list in her chart on today's review.     Then a brief history and physical exam were performed.    Review of Systems   Constitution:  Negative. Negative for chills, fever and night sweats.   HENT: Negative for congestion and headaches.    Eyes: Negative for blurred vision, left vision loss and right vision loss.   Cardiovascular: Negative for chest pain and syncope.   Respiratory: Negative for cough and shortness of breath.    Endocrine: Negative for polydipsia, polyphagia and polyuria.   Hematologic/Lymphatic: Negative for bleeding problem. Does not bruise/bleed easily.   Skin: Negative for dry skin, itching and rash.   Musculoskeletal: Negative for falls and muscle weakness.   Gastrointestinal: Negative for abdominal pain and bowel incontinence.   Genitourinary: Negative for bladder incontinence and nocturia.   Neurological: Negative for disturbances in coordination, loss of balance and seizures.   Psychiatric/Behavioral: Negative for depression. The patient does not have insomnia.    Allergic/Immunologic: Negative for hives and persistent infections.     PHYSICAL EXAM:  GEN: A&Ox3, WD WN NAD  HEENT: WNL  CHEST: CTAB, no W/R/R  HEART: RRR, no M/R/G  ABD: Soft, NT ND, BS x4 QUADS  MS; See Epic  NEURO: CN II-XII intact       The surgical consent was then reviewed with the patient, who agreed with all the contents of the consent form and it was signed. he was then given the [facility] surgery packet to bring with him to surgery for the anesthesia portion of his perioperative paperwork.   For all physicians except for Dr. Del Rosario, we will email and possibly fax the consent forms and booking sheets to [facility] pre-admit.    The patient was given the opportunity to ask questions about the surgical plan and consent form, and once no other questions were asked, I proceeded with the pre-op appointment.    PHYSICAL THERAPY:  He was also instructed regarding physical therapy and will begin on POD#1 at the [facility] location.     POST OP CARE:instructions were reviewed including care of the wound and dressing after surgery and  when he can shower.     CRUTCHES OR WALKER: It was explained to the patient that if they are having a lower extremity surgery that they will require either a walker or crutches to ambulate safely with after surgery. It was explained that a cane or other assistive devices are not sufficient to safely ambulate with after surgery. I explained to the patient that I will place an order for them to receive either crutches or a walker after surgery to go home with. It was explained that if they have crutches or a walker at home already, that they are REQUIRED to bring them to the hospital on the day of surgery. It was explained that if they do not have them at the hospital on the day of surgery that they WILL be provided a new pair or crutches or a walker to go home with to ensure ambulation will be safe if the patient needs to stop somewhere on the way home.      PAIN MANAGEMENT: Enoc Rodney was also given their pain management regimen, which includes the TENS unit given to him by [facility] along with the education required for its use.    PAIN MEDICATION:  Norco 7.5/325mg 1 po q 4-6 hours prn pain  Ultram 50 mg Take 1-2 p.o. q.6 hours p.r.n. breakthrough pain,   Phenergan 25 mg one p.o. q.6 hours p.r.n. nausea and vomiting.    Post op meds to be delivered bedside prior to discharge. Deliver to family if patient is in surgery at 5pm.    The patient was told that narcotic pain medications may make them drowsy and instructions were given to not sign legal documents, drive or operate heavy machinery, cars, or equipment while under the influence of narcotic medications. The patient was told and understands that narcotic pain medications should only be used as needed to control pain and that other options of pain control include TENs unit and ice packs/unit.     Patient was instructed to purchase and take Colace to counter possible GI side effects of taking opiates.     DVT prophylaxis was discussed with the patient today  including risk factors for developing DVTs and history of DVTs. The patient was asked if any specific recommendations were given from the doctor/s that did pre-operative surgical clearance. The patient was then given an education sheet about DVTs and PE with warning signs and symptoms of both and steps to take if they suspect either of these.    Patient was asked if they were taking or using OCP pills or devices. If they answered yes, then they were instructed to stop using OCPs at this pre-operative appointment until 2 months post-op to help prevent DVT development. They understand that there are other forms of birth control that do not involve hormones. They expressed understanding that ignoring/not following this instruction could result in a DVT which could turn into a deadly pulmonary embolism.     This along with the Modified Caprini risk assessment model for VTE in general surgical patients was used to determine the patient's DVT risk.     From: Joselito GARCIA, Kamran DA, Patricia OLIVERA, et al. Prevention of VTE in nonorthopedic surgical patients: antithrombotic therapy and prevention of thrombosis, 9th ed: American College of Chest Physicians evidence-based clinical practical guidelines. Chest 2012; 141:e227S. Copyright © 2012. Reproduced with permission from the American College of Chest Physicians.    The below listed DVT prophylaxis regimen was discussed along with SCDs during surgery and bilateral TABITHA compression stockings to be used post-op. Length of treatment has been determined to be 10-42 days post-op by the above noted Caprini assessment model. Early ambulation post-op was also discussed and emphasized with the patient.     Patient was instructed to buy and take:  Aspirin 81mg QD x 3 weeks for DVT prophylaxis starting on the evening after surgery.  Patient will also use bilateral TEDs on lower extremities, SCDs during surgery, and early ambulation post-op. If the patient was previously taking 81mg baby aspirin,  they were told to not take it will using the above stated aspirin and to restart the 81mg aspirin after completion of the aspirin dose.      Patient was also told to buy over the counter Prilosec medication and take it once daily for GI protection as long as they are taking NSAIDs or Aspirin.     Published data in Alexandra YEN et al. J Arthroplasty. Oct; 31(10):2237-40, 2016; showed that aspirin use as prophylaxis during revision total joint arthroplasty was more effective than warfarin in preventing symptomatic venous thromboembolic events and was associated with lower complications.  Patients in the study were also treated with intermittent pneumatic compression devices. Compression stockings would be our method of mechanical prophylaxis, which has been shown to be similar to pneumatic compression in the systematic review, Daniel RJ, et al. Roselyn Surg. Feb; 239(2): 162-171, 2004.     Results showed a significantly higher incidence of symptomatic venous thromboembolic events among patients in the warfarin group vs. the aspirin group (1.75% vs. 0.56%). Researchers also noted a bleeding event rate of 1.5% among patients who received warfarin compared with a rate of 0.4% among patients who received aspirin.    Patient denies history of seizures.     I explained to following and the patient expressed understanding:  The patient is currently aware of the COVID19 pandemic and that proceeding with their surgical procedure could potentially increase exposure to coronavirus in the community. The patient understands that there is the possibility of delayed or cancelled appts or PT visits in the future. They understand that infection with the coronavirus could complicate their surgery recovery. They are aware of the current policies and procedures of [facility] and the government regarding the pandemic and they were given the option of delaying my surgery. The patient elects to proceed with surgery at this time.     The patient  was instructed to practice strict social distancing, hand washing/hygiene, respiratory hygiene, and cough etiquette from now until 6 weeks following surgery to reduce the risk of ajcky coronavirus.    As there were no other questions to be asked, he was given my business card along with José Miguel Del Rosario MD business card if he has any questions or concerns prior to surgery or in the postop period.          [1]   Social History  Socioeconomic History    Marital status: Single   Tobacco Use    Smoking status: Former    Smokeless tobacco: Never   Substance and Sexual Activity    Alcohol use: Yes    Drug use: No   [2]   Current Outpatient Medications:     meloxicam (MOBIC) 15 MG tablet, Take 1 tablet (15 mg total) by mouth once daily., Disp: 30 tablet, Rfl: 2

## 2025-07-11 ENCOUNTER — TELEPHONE (OUTPATIENT)
Dept: SPORTS MEDICINE | Facility: CLINIC | Age: 45
End: 2025-07-11
Payer: MEDICAID

## 2025-07-11 ENCOUNTER — ANESTHESIA EVENT (OUTPATIENT)
Dept: SURGERY | Facility: HOSPITAL | Age: 45
End: 2025-07-11
Payer: MEDICAID

## 2025-07-11 NOTE — TELEPHONE ENCOUNTER
Called and informed patient of arrival time for surgery scheduled with Dr. José Miguel Del Rosario MD on 7/14/2025 . Informed patient to arrive for 10:00 am.

## 2025-07-14 ENCOUNTER — ANESTHESIA (OUTPATIENT)
Dept: SURGERY | Facility: HOSPITAL | Age: 45
End: 2025-07-14
Payer: MEDICAID

## 2025-07-14 ENCOUNTER — HOSPITAL ENCOUNTER (OUTPATIENT)
Facility: HOSPITAL | Age: 45
Discharge: HOME | End: 2025-07-14
Attending: ORTHOPAEDIC SURGERY | Admitting: ORTHOPAEDIC SURGERY
Payer: MEDICAID

## 2025-07-14 VITALS
RESPIRATION RATE: 17 BRPM | DIASTOLIC BLOOD PRESSURE: 67 MMHG | TEMPERATURE: 98 F | SYSTOLIC BLOOD PRESSURE: 136 MMHG | OXYGEN SATURATION: 100 % | BODY MASS INDEX: 25.84 KG/M2 | HEART RATE: 72 BPM | HEIGHT: 73 IN | WEIGHT: 195 LBS

## 2025-07-14 DIAGNOSIS — S83.232A COMPLEX TEAR OF MEDIAL MENISCUS OF LEFT KNEE, UNSPECIFIED WHETHER OLD OR CURRENT TEAR, INITIAL ENCOUNTER: ICD-10-CM

## 2025-07-14 PROCEDURE — 25000003 PHARM REV CODE 250: Performed by: NURSE PRACTITIONER

## 2025-07-14 PROCEDURE — 27201423 OPTIME MED/SURG SUP & DEVICES STERILE SUPPLY: Performed by: ORTHOPAEDIC SURGERY

## 2025-07-14 PROCEDURE — 25000003 PHARM REV CODE 250: Performed by: NURSE ANESTHETIST, CERTIFIED REGISTERED

## 2025-07-14 PROCEDURE — 63600175 PHARM REV CODE 636 W HCPCS: Performed by: ORTHOPAEDIC SURGERY

## 2025-07-14 PROCEDURE — 63600175 PHARM REV CODE 636 W HCPCS: Performed by: NURSE PRACTITIONER

## 2025-07-14 PROCEDURE — C1713 ANCHOR/SCREW BN/BN,TIS/BN: HCPCS | Performed by: ORTHOPAEDIC SURGERY

## 2025-07-14 PROCEDURE — 37000008 HC ANESTHESIA 1ST 15 MINUTES: Performed by: ORTHOPAEDIC SURGERY

## 2025-07-14 PROCEDURE — 71000033 HC RECOVERY, INTIAL HOUR: Performed by: ORTHOPAEDIC SURGERY

## 2025-07-14 PROCEDURE — 63600175 PHARM REV CODE 636 W HCPCS: Performed by: STUDENT IN AN ORGANIZED HEALTH CARE EDUCATION/TRAINING PROGRAM

## 2025-07-14 PROCEDURE — 63600175 PHARM REV CODE 636 W HCPCS: Performed by: NURSE ANESTHETIST, CERTIFIED REGISTERED

## 2025-07-14 PROCEDURE — 29882 ARTHRS KNE SRG MNISC RPR M/L: CPT | Mod: LT,,, | Performed by: ORTHOPAEDIC SURGERY

## 2025-07-14 PROCEDURE — 64448 NJX AA&/STRD FEM NRV NFS IMG: CPT | Performed by: STUDENT IN AN ORGANIZED HEALTH CARE EDUCATION/TRAINING PROGRAM

## 2025-07-14 PROCEDURE — 94761 N-INVAS EAR/PLS OXIMETRY MLT: CPT

## 2025-07-14 PROCEDURE — 36000710: Performed by: ORTHOPAEDIC SURGERY

## 2025-07-14 PROCEDURE — 25000003 PHARM REV CODE 250: Performed by: ANESTHESIOLOGY

## 2025-07-14 PROCEDURE — 63600175 PHARM REV CODE 636 W HCPCS: Performed by: PHYSICIAN ASSISTANT

## 2025-07-14 PROCEDURE — 36000711: Performed by: ORTHOPAEDIC SURGERY

## 2025-07-14 PROCEDURE — 25000003 PHARM REV CODE 250: Performed by: PHYSICIAN ASSISTANT

## 2025-07-14 PROCEDURE — 71000015 HC POSTOP RECOV 1ST HR: Performed by: ORTHOPAEDIC SURGERY

## 2025-07-14 PROCEDURE — 37000009 HC ANESTHESIA EA ADD 15 MINS: Performed by: ORTHOPAEDIC SURGERY

## 2025-07-14 PROCEDURE — 99900035 HC TECH TIME PER 15 MIN (STAT)

## 2025-07-14 PROCEDURE — 29879 ARTHRS KNE SRG ABRASJ ARTHRP: CPT | Mod: 51,LT,, | Performed by: ORTHOPAEDIC SURGERY

## 2025-07-14 DEVICE — IMPLANTABLE DEVICE
Type: IMPLANTABLE DEVICE | Site: KNEE | Status: FUNCTIONAL
Brand: FIBERSTITCH™ IMPLANT 1.5, 24° CURVED WITH TWO POLYESTER IMPLANTS AND 2-0 FIBERWI

## 2025-07-14 RX ORDER — ONDANSETRON HYDROCHLORIDE 2 MG/ML
4 INJECTION, SOLUTION INTRAVENOUS ONCE AS NEEDED
Status: DISCONTINUED | OUTPATIENT
Start: 2025-07-14 | End: 2025-07-14 | Stop reason: HOSPADM

## 2025-07-14 RX ORDER — MIDAZOLAM HYDROCHLORIDE 1 MG/ML
0.5 INJECTION, SOLUTION INTRAMUSCULAR; INTRAVENOUS
Status: DISCONTINUED | OUTPATIENT
Start: 2025-07-14 | End: 2025-07-14 | Stop reason: HOSPADM

## 2025-07-14 RX ORDER — HYDROMORPHONE HYDROCHLORIDE 2 MG/ML
INJECTION, SOLUTION INTRAMUSCULAR; INTRAVENOUS; SUBCUTANEOUS
Status: DISCONTINUED | OUTPATIENT
Start: 2025-07-14 | End: 2025-07-14

## 2025-07-14 RX ORDER — DEXAMETHASONE SODIUM PHOSPHATE 4 MG/ML
INJECTION, SOLUTION INTRA-ARTICULAR; INTRALESIONAL; INTRAMUSCULAR; INTRAVENOUS; SOFT TISSUE
Status: DISCONTINUED | OUTPATIENT
Start: 2025-07-14 | End: 2025-07-14

## 2025-07-14 RX ORDER — LIDOCAINE HYDROCHLORIDE 20 MG/ML
INJECTION INTRAVENOUS
Status: DISCONTINUED | OUTPATIENT
Start: 2025-07-14 | End: 2025-07-14

## 2025-07-14 RX ORDER — EPHEDRINE SULFATE 50 MG/ML
INJECTION, SOLUTION INTRAVENOUS
Status: DISCONTINUED | OUTPATIENT
Start: 2025-07-14 | End: 2025-07-14

## 2025-07-14 RX ORDER — SODIUM CHLORIDE 0.9 % (FLUSH) 0.9 %
3 SYRINGE (ML) INJECTION
Status: DISCONTINUED | OUTPATIENT
Start: 2025-07-14 | End: 2025-07-14 | Stop reason: HOSPADM

## 2025-07-14 RX ORDER — ONDANSETRON HYDROCHLORIDE 2 MG/ML
INJECTION, SOLUTION INTRAMUSCULAR; INTRAVENOUS
Status: DISCONTINUED | OUTPATIENT
Start: 2025-07-14 | End: 2025-07-14

## 2025-07-14 RX ORDER — DEXMEDETOMIDINE HYDROCHLORIDE 100 UG/ML
INJECTION, SOLUTION INTRAVENOUS
Status: DISCONTINUED | OUTPATIENT
Start: 2025-07-14 | End: 2025-07-14

## 2025-07-14 RX ORDER — CELECOXIB 200 MG/1
400 CAPSULE ORAL
Status: COMPLETED | OUTPATIENT
Start: 2025-07-14 | End: 2025-07-14

## 2025-07-14 RX ORDER — SODIUM CHLORIDE 9 MG/ML
INJECTION, SOLUTION INTRAVENOUS CONTINUOUS
Status: DISCONTINUED | OUTPATIENT
Start: 2025-07-14 | End: 2025-07-14 | Stop reason: HOSPADM

## 2025-07-14 RX ORDER — HYDROMORPHONE HYDROCHLORIDE 1 MG/ML
0.2 INJECTION, SOLUTION INTRAMUSCULAR; INTRAVENOUS; SUBCUTANEOUS EVERY 5 MIN PRN
Status: DISCONTINUED | OUTPATIENT
Start: 2025-07-14 | End: 2025-07-14 | Stop reason: HOSPADM

## 2025-07-14 RX ORDER — KETAMINE HCL IN 0.9 % NACL 50 MG/5 ML
SYRINGE (ML) INTRAVENOUS
Status: DISCONTINUED | OUTPATIENT
Start: 2025-07-14 | End: 2025-07-14

## 2025-07-14 RX ORDER — CEFAZOLIN 2 G/1
2 INJECTION, POWDER, FOR SOLUTION INTRAMUSCULAR; INTRAVENOUS
Status: COMPLETED | OUTPATIENT
Start: 2025-07-14 | End: 2025-07-14

## 2025-07-14 RX ORDER — PROPOFOL 10 MG/ML
VIAL (ML) INTRAVENOUS
Status: DISCONTINUED | OUTPATIENT
Start: 2025-07-14 | End: 2025-07-14

## 2025-07-14 RX ORDER — ROPIVACAINE HYDROCHLORIDE 2 MG/ML
INJECTION, SOLUTION EPIDURAL; INFILTRATION; PERINEURAL CONTINUOUS
Status: DISCONTINUED | OUTPATIENT
Start: 2025-07-14 | End: 2025-07-14 | Stop reason: HOSPADM

## 2025-07-14 RX ORDER — FENTANYL CITRATE 50 UG/ML
25 INJECTION, SOLUTION INTRAMUSCULAR; INTRAVENOUS EVERY 5 MIN PRN
Status: DISCONTINUED | OUTPATIENT
Start: 2025-07-14 | End: 2025-07-14 | Stop reason: HOSPADM

## 2025-07-14 RX ORDER — ACETAMINOPHEN 500 MG
1000 TABLET ORAL
Status: COMPLETED | OUTPATIENT
Start: 2025-07-14 | End: 2025-07-14

## 2025-07-14 RX ORDER — OXYCODONE HYDROCHLORIDE 5 MG/1
5 TABLET ORAL EVERY 4 HOURS PRN
Status: DISCONTINUED | OUTPATIENT
Start: 2025-07-14 | End: 2025-07-14 | Stop reason: HOSPADM

## 2025-07-14 RX ORDER — ROPIVACAINE HYDROCHLORIDE 5 MG/ML
INJECTION, SOLUTION EPIDURAL; INFILTRATION; PERINEURAL
Status: COMPLETED | OUTPATIENT
Start: 2025-07-14 | End: 2025-07-14

## 2025-07-14 RX ORDER — EPINEPHRINE 1 MG/ML
INJECTION, SOLUTION, CONCENTRATE INTRAVENOUS
Status: DISCONTINUED | OUTPATIENT
Start: 2025-07-14 | End: 2025-07-14 | Stop reason: HOSPADM

## 2025-07-14 RX ADMIN — HYDROMORPHONE HYDROCHLORIDE 0.4 MG: 2 INJECTION, SOLUTION INTRAMUSCULAR; INTRAVENOUS; SUBCUTANEOUS at 02:07

## 2025-07-14 RX ADMIN — FENTANYL CITRATE 100 MCG: 50 INJECTION, SOLUTION INTRAMUSCULAR; INTRAVENOUS at 12:07

## 2025-07-14 RX ADMIN — Medication 10 MG: at 02:07

## 2025-07-14 RX ADMIN — CELECOXIB 400 MG: 200 CAPSULE ORAL at 11:07

## 2025-07-14 RX ADMIN — ROPIVACAINE HYDROCHLORIDE 7.5 ML: 5 INJECTION, SOLUTION EPIDURAL; INFILTRATION; PERINEURAL at 12:07

## 2025-07-14 RX ADMIN — ONDANSETRON 4 MG: 2 INJECTION INTRAMUSCULAR; INTRAVENOUS at 02:07

## 2025-07-14 RX ADMIN — DEXMEDETOMIDINE 8 MCG: 100 INJECTION, SOLUTION, CONCENTRATE INTRAVENOUS at 02:07

## 2025-07-14 RX ADMIN — MIDAZOLAM 2 MG: 1 INJECTION INTRAMUSCULAR; INTRAVENOUS at 12:07

## 2025-07-14 RX ADMIN — SODIUM CHLORIDE: 0.9 INJECTION, SOLUTION INTRAVENOUS at 10:07

## 2025-07-14 RX ADMIN — PROPOFOL 300 MG: 10 INJECTION, EMULSION INTRAVENOUS at 12:07

## 2025-07-14 RX ADMIN — SODIUM CHLORIDE: 0.9 INJECTION, SOLUTION INTRAVENOUS at 11:07

## 2025-07-14 RX ADMIN — ACETAMINOPHEN 1000 MG: 500 TABLET ORAL at 11:07

## 2025-07-14 RX ADMIN — OXYCODONE HYDROCHLORIDE 5 MG: 5 TABLET ORAL at 03:07

## 2025-07-14 RX ADMIN — CEFAZOLIN 2 G: 2 INJECTION, POWDER, FOR SOLUTION INTRAMUSCULAR; INTRAVENOUS at 12:07

## 2025-07-14 RX ADMIN — LIDOCAINE HYDROCHLORIDE 60 MG: 20 INJECTION INTRAVENOUS at 12:07

## 2025-07-14 RX ADMIN — Medication 20 MG: at 12:07

## 2025-07-14 RX ADMIN — DEXAMETHASONE SODIUM PHOSPHATE 8 MG: 4 INJECTION, SOLUTION INTRAMUSCULAR; INTRAVENOUS at 12:07

## 2025-07-14 RX ADMIN — Medication: at 02:07

## 2025-07-14 RX ADMIN — SODIUM CHLORIDE, SODIUM GLUCONATE, SODIUM ACETATE, POTASSIUM CHLORIDE, MAGNESIUM CHLORIDE, SODIUM PHOSPHATE, DIBASIC, AND POTASSIUM PHOSPHATE: .53; .5; .37; .037; .03; .012; .00082 INJECTION, SOLUTION INTRAVENOUS at 02:07

## 2025-07-14 RX ADMIN — HYDROMORPHONE HYDROCHLORIDE 0.2 MG: 2 INJECTION, SOLUTION INTRAMUSCULAR; INTRAVENOUS; SUBCUTANEOUS at 02:07

## 2025-07-14 RX ADMIN — EPHEDRINE SULFATE 15 MG: 50 INJECTION INTRAVENOUS at 01:07

## 2025-07-14 NOTE — ANESTHESIA PREPROCEDURE EVALUATION
2025  Pre-operative evaluation for Procedure(s) (LRB):  ARTHROSCOPY, KNEE, W/ MENISCECTOMY (Left)  ARTHROSCOPY, KNEE, W/ MENISCUS REPAIR (Left)    Enoc Rodney is a 44 y.o. male     Problem List[1]    Review of patient's allergies indicates:  No Known Allergies    Medications Ordered Prior to Encounter[2]    Past Surgical History:   Procedure Laterality Date    SINUS SURGERY      thumb surgery         Social History[3]    EKD Echo:  No results found for this or any previous visit.        Pre-op Assessment    I have reviewed the Patient Summary Reports.     I have reviewed the Nursing Notes. I have reviewed the NPO Status.   I have reviewed the Medications.     Review of Systems  Anesthesia Hx:             Denies Family Hx of Anesthesia complications.    Denies Personal Hx of Anesthesia complications.                    Cardiovascular:  Exercise tolerance: good        Denies Dysrhythmias.   Denies Angina.       Denies LIRIANO.                              Pulmonary:    Denies COPD.  Denies Asthma.                    Renal/:   Denies Chronic Renal Disease.                Hepatic/GI:      Denies GERD.                Neurological:    Denies CVA.    Denies Seizures.                                Endocrine:  Denies Diabetes.               Physical Exam  General: Well nourished, Cooperative, Alert and Oriented    Airway:  Mallampati: III / II  Mouth Opening: Normal  TM Distance: Normal  Tongue: Normal  Neck ROM: Normal ROM    Dental:  Intact    Chest/Lungs:  Clear to auscultation, Normal Respiratory Rate    Heart:  Rate: Normal  Rhythm: Regular Rhythm        Anesthesia Plan  Type of Anesthesia, risks & benefits discussed:    Anesthesia Type: Gen ETT, Gen Supraglottic Airway  Intra-op Monitoring Plan: Standard ASA Monitors  Post Op Pain Control Plan: multimodal analgesia and IV/PO Opioids  PRN  Induction:  IV  Airway Plan: Direct  Informed Consent: Informed consent signed with the Patient and all parties understand the risks and agree with anesthesia plan.  All questions answered.   ASA Score: 1  Day of Surgery Review of History & Physical: H&P Update referred to the surgeon/provider.    Ready For Surgery From Anesthesia Perspective.     .           [1]   Patient Active Problem List  Diagnosis    Pain   [2]   No current facility-administered medications on file prior to encounter.     Current Outpatient Medications on File Prior to Encounter   Medication Sig Dispense Refill    meloxicam (MOBIC) 15 MG tablet Take 1 tablet (15 mg total) by mouth once daily. 30 tablet 2   [3]   Social History  Socioeconomic History    Marital status: Single   Tobacco Use    Smoking status: Former    Smokeless tobacco: Never   Substance and Sexual Activity    Alcohol use: Yes    Drug use: No

## 2025-07-14 NOTE — ANESTHESIA PROCEDURE NOTES
Intubation    Date/Time: 7/14/2025 12:54 PM    Performed by: Cheri Polo CRNA  Authorized by: Inés Lynch MD    Intubation:     Induction:  Intravenous    Intubated:  Postinduction    Mask Ventilation:  Easy mask    Attempts:  1    Attempted By:  CRNA    Difficult Airway Encountered?: No      Complications:  None    Airway Device:  Supraglottic airway/LMA    Airway Device Size:  5.0    Style/Cuff Inflation:  Cuffed (inflated to minimal occlusive pressure)    Secured at:  The lips    Placement Verified By:  Capnometry    Complicating Factors:  None    Findings Post-Intubation:  BS equal bilateral and atraumatic/condition of teeth unchanged

## 2025-07-14 NOTE — ANESTHESIA POSTPROCEDURE EVALUATION
Anesthesia Post Evaluation    Patient: Enoc Rodney    Procedure(s) Performed: Procedure(s) (LRB):  ARTHROSCOPY, KNEE, WITH  MICROFRACTURE (Left)  ARTHROSCOPY, KNEE, W/ MENISCUS REPAIR (Left)    Final Anesthesia Type: general      Patient location during evaluation: PACU  Patient participation: Yes- Able to Participate  Level of consciousness: awake and alert and oriented  Post-procedure vital signs: reviewed and stable  Pain management: adequate  Airway patency: patent    PONV status at discharge: No PONV  Anesthetic complications: no      Cardiovascular status: blood pressure returned to baseline  Respiratory status: unassisted, spontaneous ventilation and room air  Hydration status: euvolemic  Follow-up not needed.              Vitals Value Taken Time   /67 07/14/25 16:17   Temp 36.7 °C (98.1 °F) 07/14/25 16:25   Pulse 75 07/14/25 16:27   Resp 11 07/14/25 16:24   SpO2 99 % 07/14/25 16:27   Vitals shown include unfiled device data.      Event Time   Out of Recovery 15:52:13         Pain/Marcela Score: Pain Rating Prior to Med Admin: 6 (7/14/2025  3:21 PM)  Pain Rating Post Med Admin: 5 (7/14/2025  3:49 PM)  Marcela Score: 9 (7/14/2025  2:52 PM)

## 2025-07-14 NOTE — DISCHARGE INSTRUCTIONS
[address], [place]                                                                                          [phone]                   Postoperative Instructions for Knee Surgery                 Your Surgery Included:   Open               Arthroscopic   [] Ligament Repair       [x] Diagnostic           [] ACL     [] PCL     [] MCL     [] PLLC      [x] Synovectomy / Plica Removal [] Meniscal Cartilage Repair / Transplantation      [] Lysis of Adhesions / Manipulation [] Articular Cartilage Repair      [] Interval Release           [] Microfracture       [] OATS   [] ACI      [] Meniscectomy           [] Osteochondral Allograft      [x] Meniscal Cartilage Repair  [] Patellar Realignment       [] Debridement / Chondroplasty         [] Lateral Release   [] Ligament Repair      [] Articular Cartilage Repair          [] Extensor Mechanism             [x]   Microfracture  []  OATS         []  Cartilage Biopsy [] Tendon Repair          [] Ligament Reconstruction          [] Patella                  [] Quadriceps             []   ACL    []   PCL  [] High Tibial Osteotomy       [] PRP Arthrocentesis  [] Joint Replacement         [] Amniox Arthrocentesis           [] Unicompartmental   [] Patellofemoral                    [] Total Knee                  Call our office ([phone]) immediately if you experience any of the following:       Excessive bleeding or pus like drainage at the incision site       Uncontrollable pain not relieved by pain medication       Excessive swelling or redness at the incision site       Fever above 101.5 degrees not controlled with Tylenol or Motrin       Shortness of Breath       Any foul odor or blistering from the surgery site    FOR EMERGENCIES: If any unusual problems or difficulties occur, call our office at [phone], or page the  at [phone] who will direct your call appropriately    1.   Pain Management: A cold therapy cuff, pain  medications, local injections, and in some cases, regional anesthesia injections are used to manage your post-operative pain. The decision to use each of these options is based on their risks and benefits.     Medications: You were given one or more of the following medication prescriptions before leaving the hospital. Have the prescriptions filled at a pharmacy on your way home and follow the instructions on the bottles. If you need a refill, please call your pharmacy.      Narcotic Medication (usually Norco/Hydrocodone APAP, Percocet/Oxycodone APAP, Roxicodone, or Tramadol): Begin taking the medication before your knee starts to hurt. Some patients do not like to take any medication, but if you wait until your pain is severe before taking, you will be very uncomfortable for several hours waiting for the narcotic to work. Always take with food.     Nausea / Vomiting: For this issue, we prescribe Phenergan, use this medication as directed.     Cold Therapy: You may have been sent home with a [facility] cold therapy unit and wrap for your knee. Fill with ice and water, or frozen water bottles with water, to the indicated fill line and use throughout the day for the first two days and then as needed to help relieve pain and control swelling. Ice the knee in 30 minute intervals, and do not place the wrap directly onto the skin.     Regional Anesthesia Injections (Blocks): You may have been given a regional nerve block either before or after surgery. This may make your entire leg numb for 24-36 hours.                            * Proceed with caution when bearing weight on your leg.     2.   Diet: Eat a bland diet for the first day after surgery. Progress your diet as tolerated. Constipation may occur with Narcotic usage, contact our office if you are experiencing constipation.    3.   Activity: Limit your activity during the first 48 hours, keep your leg elevated with pillows under your heel. After the first 48 hours  at home, increase your activity level based on your symptoms.    4.   Dressing Change: Remove the soft dressing on the 3rd day. IF YOU HAVE A TAN AQUACEL BANDAGE ON YOUR KNEE, DO NOT REMOVE THIS. It is normal for some blood to be seen on the dressings. It is also normal for you to see apparent bruising on the skin around your knee when you remove the dressing. If present, leave the steri-strip tape across the incisions. If you are concerned by the drainage or the appearance of your knee, please call one of the numbers listed below.    5.   Showering: You may shower on the 3rd day after surgery with waterproof bandages over small incisions. If you have an incision with Prineo (clear mesh), it does not need to be covered. Do not submerge in any water until after your postoperative appointment in clinic.    6.   Knee Brace: You may have been sent home in a hinged knee brace. Your brace is set at 0 to 90 degrees of motion. Wear the brace for 6 weeks, LOCKED in full extension when walking, you will need to wear this brace at all time unless instructed otherwise. You may unlock at rest or for exercises.    7.   Your procedure did not require a Continuous Passive Motion (CPM) device.    8.   Weight Bearing: You may have been sent home with crutches. If instructed (see below), use these crutches at all times unless at complete rest.      [x] Non-weight bearing for     6 weeks (you may touch your toes to the floor)                9.  Knee Exercises: Begin these exercises the first day after surgery in order to help you regain your motion and strength. You may do the following marked exercises:     [x] Quad Sets - Begin activating your quadriceps muscle by driving your          knee downward into full knee extension while seated on a table or bed   with a towel rolled and propped under your heel     [x] Straight Leg Raise (SLR) - While jacky your quadriceps muscle, lift     your fully extended leg to the level of your  "non-operative knee (as shown)     [] Heel Slides - With the knee straight, slide your heel slowly toward your   buttocks, hold at the endpoint for 10-15 seconds, then slowly straighten     [x] Ankle pumps - With your knee straight, move your ankle in a "pumping"    fashion to activate your calf and leg muscles      10.  Physical Therapy: Physical therapy is an essential component to your recovery from surgery. Your physical therapy will start in 1-3 days.    FIRST POSTOPERATIVE VISIT: As scheduled.       "

## 2025-07-14 NOTE — OP NOTE
[facility] \[facility] [place] [facility]\"")  Surgery Department  Operative Note    SUMMARY     Date of Procedure: 7/14/2025     Procedure: Procedure(s) (LRB):  ARTHROSCOPY, KNEE, WITH  MICROFRACTURE (Left)  ARTHROSCOPY, KNEE, W/ MENISCUS REPAIR (Left)     Surgeons and Role:     * José Miguel Del Rosario MD - Primary    First Assistant:   Dong Root MD     **The use of a first assistant was necessary for positioning, suture placement, intraoperative decision making and closure.  The case could not have been done without the use of a qualified first assistant.       Assistant:  Alexandre Bro PA-C    Pre-Operative Diagnosis: Complex tear of medial meniscus of left knee, unspecified whether old or current tear, initial encounter [S83.232A]    Post-Operative Diagnosis: Post-Op Diagnosis Codes:     * Complex tear of medial meniscus of left knee, unspecified whether old or current tear, initial encounter [S83.232A]    Anesthesia: General    Technical Procedures Used:     DATE OF PROCEDURE: 7/14/2025      PREOPERATIVE DIAGNOSES:   1. Left knee complex medial meniscus tear.      POSTOPERATIVE DIAGNOSES:   1. Left knee complex medial meniscus tear.      PROCEDURES:   1. Left knee arthroscopic medial meniscus repair (complex)  2. Left knee microfracture intercondylar notch     SURGEON: José Miguel Del Rosario M.D.      First Assistant:   Dong Root MD     **The use of a first assistant was necessary for positioning, suture placement, intraoperative decision making and closure.  The case could not have been done without the use of a qualified first assistant.       Assistant:  Alexandre Bro PA-C     COMPLICATIONS: None.      POSITION: Supine with arthroscopic leg ojeda.      ANESTHESIA: General endotracheal plus block.      COMPLICATIONS: None.      TOURNIQUET TIME: None     EBL: Minimal     EXAMINATION UNDER ANESTHESIA:   Knee: full range of motion, no evidence of instability.      IMPLANTS: Arthrex Ffiberstitch x 7      ARTHROSCOPIC FINDINGS:   1. Complex tear, horizontal cleavage, longitudinal body / posterior horn medial meniscus.   2. Intact lateral meniscus     INDICATIONS: The patient is a 44 y.o. -year-old male with a history of left knee pain. MRI confirms a tear in his medial meniscus. After the risks and benefits of surgery were discussed with the patient, including bleeding, infection, scarring, persistent pain, risk of blood clots (DVT), pulmonary embolism, compartment syndrome, damage to cartilage, damage to neurovascular structures, plus the risks of anesthesia, including, death, stroke, and heart attack, the patient wished to proceed with operative intervention.        DESCRIPTION OF PROCEDURE:      The patient and correct limb were identified and marked in the pre-op holding area.      The patient was brought in the room. After undergoing general endotracheal anesthesia, he was placed on a well-padded operating table. his left lower extremity was prepped and draped in usual sterile fashion. A nonsterile tourniquet was placed high up around the thigh. A well padded arthroscopic leg ojeda was used during the case. The contralateral leg was placed in a well padded Carroll stirrup with bony prominences all being well padded.      The standard inferolateral and inferomedial portals were created. Diagnostic arthroscopy performed.      The patellofemoral joint was entered. There was no significant chondromalacia on the trochlea or on the undersurface of the patella.     There was a mild synovitis noted within the anterior interval. An VAPR device was used to remove areas of irritating synovium from the overlying trochlea in the anterior interval to allow for visualization to minimize abrasion.     MENISCUS REPAIR: Attention turned to the medial compartment. Medial femoral condyle was intact. There was an unstable tear in the body and posterior horn of the medial meniscus. After probing the tear, this was judged to be  repairable and in the white-red zone and longitudinal. After abrading the tear sites, a total of 7 Arthrex all inside Fiberstitch suture repair devices were used. Circumferential vertical mattress sutures were placed with one throw on top of the meniscus and one on the inferior aspect. Compression across our tear site was achieved and excellent stability was achieved after all seven sutures were placed.     Attention was turned to the intercondylar notch. The ACL and PCL were intact.      Attention was turned to the lateral compartment. The lateral meniscus was intact and the lateral femoral condyle and lateral tibial plateau were intact.      MICROFRACTURE NOTCH: To encourage biological healing at the repair site, the soft tissue in front of the ACL was cleaned off and a total of four microfracture vents were placed with a kalina awl. Microfracture vents were placed 2-3 mm apart and excellent bleeding into the joint was achieved after pump pressure was let down.     Portal sites were closed with 4-0 Monocryl, covered with Mastisol, Steri-Strips, Xeroform, 4 x 4 fluffs, ABD pads and thigh-high TABITHA hose.     The patient was extubated in the room, transferred to Recovery Room in stable condition accompanied by his physician. There were no complications in the case.      I was present, scrubbed and did perform all critical portions of the case.    The post op plan was to follow our meniscus repair protocol.      José Miguel Del Rosario MD      Description of the Findings of the Procedure: above    Significant Surgical Tasks Conducted by the Assistant(s), if Applicable: none    Complications: No    Estimated Blood Loss (EBL): * No values recorded between 7/14/2025 12:38 PM and 7/14/2025  2:56 PM *           Implants:   Implant Name Type Inv. Item Serial No.  Lot No. LRB No. Used Action   ANCHOR FIBERSTITCH 1.5 24D CRV - [id number]  ANCHOR FIBERSTITCH 1.5 24D CRV  [employer] [id number] Left 1 Implanted   ANCHOR  FIBERSTITCH 1.5 24D CRV - [id number]  ANCHOR FIBERSTITCH 1.5 24D CRV  [employer] [id number] Left 1 Implanted   ANCHOR FIBERSTITCH 1.5 24D CRV - [id number]  ANCHOR FIBERSTITCH 1.5 24D CRV  [employer] [id number] Left 1 Implanted   ANCHOR FIBERSTITCH 1.5 24D CRV - [id number]  ANCHOR FIBERSTITCH 1.5 24D CRV  [employer] [id number] Left 1 Implanted   ANCHOR FIBERSTITCH 1.5 24D CRV - [id number]  ANCHOR FIBERSTITCH 1.5 24D CRV  [employer] [id number] Left 1 Implanted   ANCHOR FIBERSTITCH 1.5 24D CRV - [id number]  ANCHOR FIBERSTITCH 1.5 24D CRV  AR[employer] [id number] Left 1 Implanted and Explanted   ANCHOR FIBERSTITCH 1.5 24D CRV - [id number]  ANCHOR FIBERSTITCH 1.5 24D CRV  [employer] [id number] Left 1 Implanted   ANCHOR FIBERSTITCH 1.5 24D CRV - [id number]  ANCHOR FIBERSTITCH 1.5 24D CRV  [employer] [id number] Left 1 Implanted   ANCHOR FIBERSTITCH 1.5 24D CRV - [id number]  ANCHOR FIBERSTITCH 1.5 24D CRV  AR[employer] [id number] Left 1 Implanted   ANCHOR FIBERSTITCH 1.5 24D CRV - [id number]  ANCHOR FIBERSTITCH 1.5 24D CRV  ARTH[employer]X [id number] Left 1 Implanted       Specimens:   Specimen (24h ago, onward)      None                    Condition: Good    Disposition: PACU - hemodynamically stable.    Attestation: I was present and scrubbed for the entire procedure.    Discharge Note    SUMMARY     Admit Date: 7/14/2025    Discharge Date and Time: 07/14/2025 3:11 PM    Hospital Course (synopsis of major diagnoses, care, treatment, and services provided during the course of the hospital stay): outpatient surgery     Final Diagnosis: Post-Op Diagnosis Codes:     * Complex tear of medial meniscus of left knee, unspecified whether old or current tear, initial encounter [S83.232A]    Disposition: Home or Self Care    Follow Up/Patient Instructions:     Medications:  Reconciled Home Medications:      Medication List        CONTINUE taking these medications      aspirin 81 MG Chew  Chew and swallow 1 tablet (81 mg total) by mouth once daily. for 21 days     HYDROcodone-acetaminophen 7.5-325 mg per  tablet  Commonly known as: NORCO  Take 1 tablet by mouth every 6 (six) hours as needed for Pain.     meloxicam 15 MG tablet  Commonly known as: MOBIC  Take 1 tablet (15 mg total) by mouth once daily.     promethazine 25 MG tablet  Commonly known as: PHENERGAN  Take 1 tablet (25 mg total) by mouth every 6 (six) hours as needed for Nausea.     traMADoL 50 mg tablet  Commonly known as: ULTRAM  Take 1 tablet (50 mg total) by mouth every 6 (six) hours as needed for Pain.            Discharge Procedure Orders   Keep surgical extremity elevated     Ice to affected area     Notify your health care provider if you experience any of the following:  temperature >100.4     Notify your health care provider if you experience any of the following:  persistent nausea and vomiting or diarrhea     Notify your health care provider if you experience any of the following:  severe uncontrolled pain     Notify your health care provider if you experience any of the following:  redness, tenderness, or signs of infection (pain, swelling, redness, odor or green/yellow discharge around incision site)      Remove dressing in 72 hours

## 2025-07-14 NOTE — TRANSFER OF CARE
"Anesthesia Transfer of Care Note    Patient: Enoc Rodney    Procedure(s) Performed: Procedure(s) (LRB):  ARTHROSCOPY, KNEE, WITH  MICROFRACTURE (Left)  ARTHROSCOPY, KNEE, W/ MENISCUS REPAIR (Left)    Patient location: PACU    Anesthesia Type: general    Transport from OR: Transported from OR on 6-10 L/min O2 by face mask with adequate spontaneous ventilation    Post pain: adequate analgesia    Post assessment: no apparent anesthetic complications    Post vital signs: stable    Level of consciousness: awake    Nausea/Vomiting: no nausea/vomiting    Complications: none    Transfer of care protocol was followedComments: HR = 85, O2 sat = 100%, RR= 10, BP = 132/61       Last vitals: Visit Vitals  BP (!) 148/74 (BP Location: Right arm, Patient Position: Lying)   Pulse 64   Temp 37 °C (98.6 °F) (Tympanic)   Resp 20   Ht 6' 1" (1.854 m)   Wt 88.5 kg (195 lb)   SpO2 100%   BMI 25.73 kg/m²     "

## 2025-07-14 NOTE — OPERATIVE NOTE ADDENDUM
Certification of Assistant at Surgery       Surgery Date: 7/14/2025     Participating Surgeons:  Surgeons and Role:     * José Miguel Del Rosario MD - Primary    Procedures:  Procedure(s) (LRB):  ARTHROSCOPY, KNEE, WITH  MICROFRACTURE (Left)  ARTHROSCOPY, KNEE, W/ MENISCUS REPAIR (Left)    Assistant Surgeon's Certification of Necessity:  I understand that section 1842 (b) (6) (d) of the Social Security Act generally prohibits Medicare Part B reasonable charge payment for the services of assistants at surgery in teaching hospitals when qualified residents are available to furnish such services. I certify that the services for which payment is claimed were medically necessary, and that no qualified resident was available to perform the services. I further understand that these services are subject to post-payment review by the Medicare carrier.      Alexandre Bro PA-C    07/14/2025  2:53 PM

## 2025-07-14 NOTE — PLAN OF CARE
VSS. Pt able to tolerate oral liquids. Pt reports tolerable pain level for D/C. Ice pack and dressing intact. Thigh TEDs intact. S/O received home meds per bedside delivery. Crutches at bedside for home use. No distress noted. Pt states he is ready for D/C. D/C and CADD pump instructions reviewed with pt and S/O, verbalized understanding.

## 2025-07-14 NOTE — PLAN OF CARE
"Nursing pre-op complete. Pt calm, will continue to monitor. Call light within reach. Pt dropped off at facility by wife. Wife will return later for post-op. Pt needs crutches, Ht 6'1".   "

## 2025-07-14 NOTE — ANESTHESIA PROCEDURE NOTES
Peripheral Block    Patient location during procedure: pre-op   Block not for primary anesthetic.  Reason for block: at surgeon's request and post-op pain management   Post-op Pain Location: left knee   Start time: 7/14/2025 12:05 PM  Timeout: 7/14/2025 12:05 PM   End time: 7/14/2025 12:15 PM    Staffing  Authorizing Provider: Inés Lynch MD  Performing Provider: Inés Lynch MD    Staffing  Performed by: Inés Lynch MD  Authorized by: Inés Lynch MD    Preanesthetic Checklist  Completed: patient identified, IV checked, site marked, risks and benefits discussed, surgical consent, monitors and equipment checked, pre-op evaluation and timeout performed  Peripheral Block  Patient position: supine  Prep: ChloraPrep and site prepped and draped  Patient monitoring: heart rate, cardiac monitor, continuous pulse ox, continuous capnometry and frequent blood pressure checks  Block type: adductor canal  Laterality: left  Injection technique: continuous  Needle  Needle type: Tuohy   Needle gauge: 17 G  Needle length: 3.5 in  Needle localization: anatomical landmarks and ultrasound guidance  Catheter type: spring wound  Catheter size: 19 G  Test dose: lidocaine 1.5% with Epi 1-to-200,000 and negative   -ultrasound image captured on disc.  Assessment  Injection assessment: negative aspiration, negative parasthesia and local visualized surrounding nerve  Paresthesia pain: none  Heart rate change: no  Slow fractionated injection: yes  Pain Tolerance: comfortable throughout block and no complaints  Medications:    Medications: ropivacaine (NAROPIN) injection 0.5% - Perineural   7.5 mL - 7/14/2025 12:10:00 PM    Additional Notes  VSS.  [facility] RN monitoring vitals throughout procedure.  Patient tolerated procedure well.

## 2025-07-16 ENCOUNTER — CLINICAL SUPPORT (OUTPATIENT)
Dept: REHABILITATION | Facility: HOSPITAL | Age: 45
End: 2025-07-16
Payer: MEDICAID

## 2025-07-16 DIAGNOSIS — S83.232A COMPLEX TEAR OF MEDIAL MENISCUS OF LEFT KNEE, UNSPECIFIED WHETHER OLD OR CURRENT TEAR, INITIAL ENCOUNTER: ICD-10-CM

## 2025-07-16 DIAGNOSIS — M25.562 LEFT KNEE PAIN, UNSPECIFIED CHRONICITY: Primary | ICD-10-CM

## 2025-07-16 PROCEDURE — 97161 PT EVAL LOW COMPLEX 20 MIN: CPT

## 2025-07-16 NOTE — ANESTHESIA POST-OP PAIN MANAGEMENT
Acute Pain Service Progress Note    7/16/2025 1023    Unable to reach patient for [facility] infusion follow up. Voicemail left on patient's cell number encouraging to contact anesthesia at [phone] or [facility] 24/7 support line at [phone] for any questions or concerns related to the nerve block or infusion pump.

## 2025-07-17 PROBLEM — M25.562 LEFT KNEE PAIN: Status: ACTIVE | Noted: 2025-07-17

## 2025-07-17 NOTE — PROGRESS NOTES
Outpatient Rehab    Physical Therapy Evaluation (only)    Patient Name: Enoc Rodney  MRN: [id number]  YOB: 1980  Encounter Date: 7/16/2025    Therapy Diagnosis:   Encounter Diagnoses   Name Primary?    Left knee pain, unspecified chronicity Yes    Complex tear of medial meniscus of left knee, unspecified whether old or current tear, initial encounter      Physician: Alexandre Bro PA-C    Physician Orders: Eval and Treat  Medical Diagnosis: Complex tear of medial meniscus of left knee, unspecified whether old or current tear, initial encounter  Surgical Diagnosis: L meniscus repair  Surgical Date: 7/14/2025  Days Since Last Surgery: 3    Visit # / Visits Authorized:  1 / 1  Insurance Authorization Period: 7/10/2025 to 7/10/2026  Date of Evaluation: 7/16/2025  Plan of Care Certification: 7/16/2025 to 10/3/2025     Time In: 0910   Time Out: 1005  Total Time (in minutes): 55   Total Billable Time (in minutes):  55     Intake Outcome Measure for FOTO Survey    Therapist reviewed FOTO scores for Enoc Rodney on 7/16/2025.   FOTO report - see Media section or FOTO account episode details.     Intake Score: Not applicable for this Episode - will take at follow up visit    Precautions:  Left Lower Extremity Weight-Bearing Status: Non-weight-bearing  Additional Precautions and Protocol Details: 0-90 degrees for 6 weeks, NWB, t-scope brace locked in extension for ambulationg    Subjective   History of Present Illness  Enoc is a 44 y.o. male who reports to physical therapy with a chief concern of left knee pain s/p left meniscus repair.     The patient reports a medical diagnosis of S83.232A (ICD-10-CM) - Complex tear of medial meniscus of left knee, unspecified whether old or current tear, initial encounter.  Patient reports a surgery of L meniscus repair. Surgery occurred on 07/14/25.         History of Present Condition/Illness: Enoc presents to physical therapy ambulating with bilateral axillary crutches  "and knee t-scope brace locked in extension. Patient is NWB with range of motion precaution to 90 degrees for initial 6 weeks. Patient reports that he has been icing and elevating his left leg as well as performing ankle pumps. 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 He would like to return to his prior level of function without pain. Mechanism of injury prior to surgery was "twisting knee."    Treatment History  Treatments  Previously Received Treatments: No  Currently Receiving Treatments: No    Living Arrangements  Living Situation  Living Arrangements: Family members, Children, Spouse/significant other  Support Systems: Spouse/significant other          Past Medical History/Physical Systems Review:   Enoc Rodney  has no past medical history on file.    Enoc Rodney  has a past surgical history that includes Sinus surgery; thumb surgery; arthroscopy, knee, with abrasion arthroplasty or microfracture (Left, 7/14/2025); and arthroscopy,knee,with meniscus repair (Left, 7/14/2025).    Enoc has a current medication list which includes the following prescription(s): aspirin, hydrocodone-acetaminophen, meloxicam, promethazine, and tramadol.    Review of patient's allergies indicates:  No Known Allergies     Objective   Knee Observations  Right Knee Observations  Not Present: Edema, Incision, and Straight Leg Raise Extensor Lag  Left Knee Observations  Present: Edema, Incision, and Straight Leg Raise Extensor Lag  Left Knee Incision Observations  Present: Clean, Dry, and Drainage     Dried blood around incision sites; no active bleeding       Knee Range of Motion   Right Knee   Active (deg) Passive (deg) Pain   Flexion 130       Extension 5 (hyperextension)           Left Knee   Active (deg) Passive (deg) Pain   Flexion   80     Extension 0                          Knee Strength   Right Strength Right Pain Left Strength Left  Pain   Flexion (S2)           Prone Flexion           Extension " (L3)             Knee Extensor Lag  Lag Present: Left  No Lag: Right  N/T - will test in future visit.            Ambulation Assistance Required  Surface With  Assistive Device Without Assistive Device Details   Level Independent    Ambulating with bilateral axillary crutches   Uneven         Curb                Time Entry(in minutes):  PT Evaluation (Low) Time Entry: 55    Assessment & Plan   Assessment  Enoc presents with a condition of Low complexity.   Presentation of Symptoms: Stable  Will Comorbidities Impact Care: No       Functional Limitations: Activity tolerance, Completing self-care activities, Proprioception, Range of motion, Participating in leisure activities, Pain with ADLs/IADLs, Gross motor coordination  Impairments: Pain with functional activity, Impaired physical strength  Personal Factors Affecting Prognosis: Pain    Patient Goal for Therapy (PT): to return to prior level of function  Prognosis: Good  Assessment Details: Enoc is a 44-year-old male with a medical diagnosis of S83.232A (ICD-10-CM) - Complex tear of medial meniscus of left knee, unspecified whether old or current tear, initial encounter. He is 2 days status post left meniscus repair. Swelling in left knee as expected at this point post op. Patient was educated on precautions for initial 6 weeks post op. Patient demonstrates deficits with range of motion, strength, and function that limit ability to participate in work and recreational activities. He would benefit from skilled PT services to normalize kinetic chain mobility, strength, and function to safely return to their prior level of activity.     Plan  From a physical therapy perspective, the patient would benefit from: Skilled Rehab Services    Planned therapy interventions include: Therapeutic exercise, Therapeutic activities, Neuromuscular re-education, Manual therapy, ADLs/IADLs, Other (Comment), and Gait training. Dry Needling (prn)  Planned modalities to include:  Biofeedback, Electrical stimulation - attended, Electrical stimulation - passive/unattended, Thermotherapy (hot pack), and Cryotherapy (cold pack).        Visit Frequency: 2 times Per Week for 10 Weeks.       This plan was discussed with Patient.   Discussion participants: Agreed Upon Plan of Care  Plan details: Frequency and duration of treatment to be adjusted as needed          The patient's spiritual, cultural, and educational needs were considered, and the patient is agreeable to the plan of care and goals.           Goals:   Active       Ambulation/movement       Patient will walk for 30 minutes with least restrictive device to demonstrate improved functional mobility (Ongoing)       Start:  07/17/25    Expected End:  10/03/25               Functional outcome       Patient will show a significant change in FOTO patient-reported outcome tool to demonstrate subjective improvement (Ongoing)       Start:  07/17/25    Expected End:  10/03/25            Patient stated goal: to return to prior level of function  (Ongoing)       Start:  07/17/25    Expected End:  10/03/25            Patient will demonstrate independence in home program for support of progression (Ongoing)       Start:  07/17/25    Expected End:  09/05/25               Leisure activities       Patient will report swimming for 20 minutes with </= 3/10 pain (Ongoing)       Start:  07/17/25    Expected End:  10/03/25               Pain       Patient will report a 2 point reduction in pain while performing PT exercises (Ongoing)       Start:  07/17/25    Expected End:  09/05/25               Range of Motion       Patient will achieve left knee ROM of  0-130 degrees  (Ongoing)       Start:  07/17/25    Expected End:  09/05/25                Devorah Gregg, PT, DPT

## 2025-07-18 ENCOUNTER — CLINICAL SUPPORT (OUTPATIENT)
Dept: REHABILITATION | Facility: HOSPITAL | Age: 45
End: 2025-07-18
Payer: MEDICAID

## 2025-07-18 DIAGNOSIS — M25.562 LEFT KNEE PAIN, UNSPECIFIED CHRONICITY: Primary | ICD-10-CM

## 2025-07-18 PROCEDURE — 97014 ELECTRIC STIMULATION THERAPY: CPT

## 2025-07-18 PROCEDURE — 97110 THERAPEUTIC EXERCISES: CPT

## 2025-07-21 ENCOUNTER — CLINICAL SUPPORT (OUTPATIENT)
Dept: REHABILITATION | Facility: HOSPITAL | Age: 45
End: 2025-07-21
Payer: MEDICAID

## 2025-07-21 DIAGNOSIS — M25.562 LEFT KNEE PAIN, UNSPECIFIED CHRONICITY: Primary | ICD-10-CM

## 2025-07-21 PROCEDURE — 97110 THERAPEUTIC EXERCISES: CPT | Mod: CQ

## 2025-07-21 PROCEDURE — 97014 ELECTRIC STIMULATION THERAPY: CPT | Mod: CQ

## 2025-07-21 NOTE — PROGRESS NOTES
Outpatient Rehab    Physical Therapy Visit    Patient Name: Enoc Rodney  MRN: [id number]  YOB: 1980  Encounter Date: 7/21/2025    Therapy Diagnosis:   Encounter Diagnosis   Name Primary?    Left knee pain, unspecified chronicity Yes     Physician: Alexandre Bro, EMILY    Physician Orders: Eval and Treat  Medical Diagnosis: Complex tear of medial meniscus, current injury, left knee, initial encounter  Surgical Diagnosis: L meniscus repair   Surgical Date: 7/14/2025  Days Since Last Surgery: 7    Visit # / Visits Authorized:  2 / 20  Insurance Authorization Period: 7/15/2025 to 7/15/2026  Date of Evaluation: 7/16/2025  Plan of Care Certification: 7/17/2025 to 10/3/2025      PT/PTA: PTA   Number of PTA visits since last PT visit:1  Time In: 0756   Time Out: 0904  Total Time (in minutes): 68   Total Billable Time (in minutes): 68    FOTO:  Intake Score (%): 30  Survey Score 2 (%): Not applicable for this Episode  Survey Score 3 (%): Not applicable for this Episode    Precautions:  Left Lower Extremity Weight-Bearing Status: Non-weight-bearing  Additional Precautions and Protocol Details: 0-90 degrees for 6 weeks, NWB, t-scope brace locked in extension for ambulationg      Subjective   Patient reports he is doing ok, no new complaints.  Pain reported as 0/10. Left knee    Objective  Objective Measures updated at progress report unless specified.     Treatment:  *Per Medicaid guidelines all therapy billed as therex*  *Therapist one-on-one with patient for entire session with PT extender utilized for remainder of therapy session*    Therapeutic Exercise  TE 1: Education on prognosis, plan of care, home exercise program, exercise justification, involved anatomy, swelling/pain management  TE 2: NMES (portable unit): quad sets with heel prop: 10 minutes, SAQs with medium bolster: 12 minutes, SAQs with 1/2 foam: 12 minutes with strap  TE 3: SLR, SL Hip ABD: 2 x 10 reps each on Left LE      Time Entry(in  minutes):  E-Stim (Unattended) Time Entry: 40  Therapeutic Exercise Time Entry: 67    Assessment & Plan   Assessment: Enoc is 1 weeks, 0 days s/p Left meniscus repair. Presents ambulating with bilateral axillary crutches. He notes adequate muscle response with NMES. Cueing to maintain quad activation during SLR to prevent extensor lag. Overall good tolerance to therapeutic interventions.  Evaluation/Treatment Tolerance: Patient tolerated treatment well    The patient will continue to benefit from skilled outpatient physical therapy in order to address the deficits listed in the problem list on the initial evaluation, provide patient and family education, and maximize the patients level of independence in the home and community environments.     The patient's spiritual, cultural, and educational needs were considered, and the patient is agreeable to the plan of care and goals.           Plan: Will continue per POC towards treatment goals. PT/PTA met face to face to discuss patient's treatment plan and progress towards established goals. Patient will be seen by physical therapist every sixth visit and minimally once per month.    Goals:   Active       Ambulation/movement       Patient will walk for 30 minutes with least restrictive device to demonstrate improved functional mobility (Progressing)       Start:  07/17/25    Expected End:  10/03/25               Functional outcome       Patient will show a significant change in FOTO patient-reported outcome tool to demonstrate subjective improvement (Progressing)       Start:  07/17/25    Expected End:  10/03/25            Patient stated goal: to return to prior level of function  (Progressing)       Start:  07/17/25    Expected End:  10/03/25            Patient will demonstrate independence in home program for support of progression (Progressing)       Start:  07/17/25    Expected End:  09/05/25               Leisure activities       Patient will report swimming for 20  minutes with </= 3/10 pain (Progressing)       Start:  07/17/25    Expected End:  10/03/25               Pain       Patient will report a 2 point reduction in pain while performing PT exercises (Progressing)       Start:  07/17/25    Expected End:  09/05/25               Range of Motion       Patient will achieve left knee ROM of  0-130 degrees  (Progressing)       Start:  07/17/25    Expected End:  09/05/25                Paula Valdes, PTA

## 2025-07-21 NOTE — PROGRESS NOTES
Outpatient Rehab    Physical Therapy Visit    Patient Name: Enoc Rodney  MRN: [id number]  YOB: 1980  Encounter Date: 7/18/2025    Therapy Diagnosis:   Encounter Diagnosis   Name Primary?    Left knee pain, unspecified chronicity Yes     Physician: Alexandre Bro, EMILY    Physician Orders: Eval and Treat  Medical Diagnosis: Complex tear of medial meniscus, current injury, left knee, initial encounter  Surgical Diagnosis: L meniscus repair   Surgical Date: 7/14/2025  Days Since Last Surgery: 7    Visit # / Visits Authorized:  2 / 20  Insurance Authorization Period: 7/15/2025 to 7/15/2026  Date of Evaluation: 7/16/2025  Plan of Care Certification: 7/17/2025 to 10/3/2025      PT/PTA: PT   Number of PTA visits since last PT visit:0  Time In: 1100   Time Out: 1202  Total Time (in minutes): 62   Total Billable Time (in minutes):  62    FOTO:  Intake Score (%): 30  Survey Score 2 (%): Not applicable for this Episode  Survey Score 3 (%): Not applicable for this Episode    Precautions:  Left Lower Extremity Weight-Bearing Status: Non-weight-bearing  Additional Precautions and Protocol Details: 0-90 degrees for 6 weeks, NWB, t-scope brace locked in extension for ambulationg      Subjective   Enoc reports compliance with his home exercise program. No new reports of pain.         Objective            Treatment:  Therapeutic Exercise  TE 1: Education on prognosis, plan of care, home exercise program, exercise justification, involved anatomy, swelling/pain management  TE 2: NMES (portable unit): quad sets with heel prop: 10 minutes, SAQs with medium bolster: 12 minutes, SAQs with 1/2 foam: 12 minutes with strap  TE 3: SLR, SL Hip ABD: 2 x 10 reps each on Left LE      Time Entry(in minutes):  Therapeutic Exercise Time Entry: 62    Assessment & Plan   Assessment: Enoc is 4 days status post left meniscus repair. Introduced NMES for quad neuromuscular control. Tolerated today's session well. Progress home exercises  to include hip strengthening. Will continue to progress as tolerated.   Evaluation/Treatment Tolerance: Patient tolerated treatment well    The patient will continue to benefit from skilled outpatient physical therapy in order to address the deficits listed in the problem list on the initial evaluation, provide patient and family education, and maximize the patients level of independence in the home and community environments.     The patient's spiritual, cultural, and educational needs were considered, and the patient is agreeable to the plan of care and goals.           Plan: Continue hip exercises as well    Goals:   Active       Ambulation/movement       Patient will walk for 30 minutes with least restrictive device to demonstrate improved functional mobility (Progressing)       Start:  07/17/25    Expected End:  10/03/25               Functional outcome       Patient will show a significant change in FOTO patient-reported outcome tool to demonstrate subjective improvement (Progressing)       Start:  07/17/25    Expected End:  10/03/25            Patient stated goal: to return to prior level of function  (Progressing)       Start:  07/17/25    Expected End:  10/03/25            Patient will demonstrate independence in home program for support of progression (Progressing)       Start:  07/17/25    Expected End:  09/05/25               Leisure activities       Patient will report swimming for 20 minutes with </= 3/10 pain (Progressing)       Start:  07/17/25    Expected End:  10/03/25               Pain       Patient will report a 2 point reduction in pain while performing PT exercises (Progressing)       Start:  07/17/25    Expected End:  09/05/25               Range of Motion       Patient will achieve left knee ROM of  0-130 degrees  (Progressing)       Start:  07/17/25    Expected End:  09/05/25                Devorah Gregg, PT, DPT

## 2025-07-24 ENCOUNTER — CLINICAL SUPPORT (OUTPATIENT)
Dept: REHABILITATION | Facility: HOSPITAL | Age: 45
End: 2025-07-24
Payer: MEDICAID

## 2025-07-24 DIAGNOSIS — M25.562 LEFT KNEE PAIN, UNSPECIFIED CHRONICITY: Primary | ICD-10-CM

## 2025-07-24 PROCEDURE — 97110 THERAPEUTIC EXERCISES: CPT | Mod: CQ

## 2025-07-24 PROCEDURE — 97014 ELECTRIC STIMULATION THERAPY: CPT | Mod: CQ

## 2025-07-24 NOTE — PROGRESS NOTES
"  Outpatient Rehab    Physical Therapy Visit    Patient Name: Enoc Rodney  MRN: [id number]  YOB: 1980  Encounter Date: 7/24/2025    Therapy Diagnosis:   Encounter Diagnosis   Name Primary?    Left knee pain, unspecified chronicity Yes     Physician: Alexandre Bro PA-C    Physician Orders: Eval and Treat  Medical Diagnosis: Complex tear of medial meniscus, current injury, left knee, initial encounter  Surgical Diagnosis: L meniscus repair   Surgical Date: 7/14/2025  Days Since Last Surgery: 10    Visit # / Visits Authorized:  3 / 10  Insurance Authorization Period: 7/15/2025 to 9/9/2025  Date of Evaluation: 7/16/2025  Plan of Care Certification: 7/17/2025 to 10/3/2025      PT/PTA: PTA   Number of PTA visits since last PT visit:2  Time In: 1100   Time Out: 1210  Total Time (in minutes): 70   Total Billable Time (in minutes): 70    FOTO:  Intake Score (%): 30  Survey Score 2 (%): Not applicable for this Episode  Survey Score 3 (%): Not applicable for this Episode    Precautions:  Left Lower Extremity Weight-Bearing Status: Non-weight-bearing  Additional Precautions and Protocol Details: 0-90 degrees for 6 weeks, NWB, t-scope brace locked in extension for ambulationg      Subjective   Patient reports no pain today, he was stretching and notes a "squeaky sound" with bending.  Pain reported as 0/10. Left knee    Objective  Objective Measures updated at progress report unless specified.     Treatment:  Therapeutic Exercise  TE 1: Education on prognosis, plan of care, home exercise program, exercise justification, involved anatomy, swelling/pain management  TE 2: NMES (portable unit): quad sets with heel prop: 15 minutes, SAQs with medium bolster: 12 minutes, SAQs with 1/2 foam: 12 minutes with strap  TE 3: SLR, SL Hip ABD: 2 x 10 reps each on Left LE      Time Entry(in minutes):  E-Stim (Unattended) Time Entry: 39  Therapeutic Exercise Time Entry: 70    Assessment & Plan   Assessment: Enoc is 1 weeks, 3 " days s/p Left meniscus repair. Presents ambulating with bilateral axillary crutches. He notes adequate muscle response with NMES. Cueing to maintain quad activation during SLR to prevent extensor lag. Notes some patellar tendon discomfort likely due to decreased motor control and muscle endurance.   Evaluation/Treatment Tolerance: Patient tolerated treatment well    The patient will continue to benefit from skilled outpatient physical therapy in order to address the deficits listed in the problem list on the initial evaluation, provide patient and family education, and maximize the patients level of independence in the home and community environments.     The patient's spiritual, cultural, and educational needs were considered, and the patient is agreeable to the plan of care and goals.           Plan: Will continue per POC towards treatment goals. PT/PTA met face to face to discuss patient's treatment plan and progress towards established goals. Patient will be seen by physical therapist every sixth visit and minimally once per month.    Goals:   Active       Ambulation/movement       Patient will walk for 30 minutes with least restrictive device to demonstrate improved functional mobility (Progressing)       Start:  07/17/25    Expected End:  10/03/25               Functional outcome       Patient will show a significant change in FOTO patient-reported outcome tool to demonstrate subjective improvement (Progressing)       Start:  07/17/25    Expected End:  10/03/25            Patient stated goal: to return to prior level of function  (Progressing)       Start:  07/17/25    Expected End:  10/03/25            Patient will demonstrate independence in home program for support of progression (Progressing)       Start:  07/17/25    Expected End:  09/05/25               Leisure activities       Patient will report swimming for 20 minutes with </= 3/10 pain (Progressing)       Start:  07/17/25    Expected End:  10/03/25                Pain       Patient will report a 2 point reduction in pain while performing PT exercises (Progressing)       Start:  07/17/25    Expected End:  09/05/25               Range of Motion       Patient will achieve left knee ROM of  0-130 degrees  (Progressing)       Start:  07/17/25    Expected End:  09/05/25                Paula Valdes PTA

## 2025-07-29 ENCOUNTER — OFFICE VISIT (OUTPATIENT)
Dept: SPORTS MEDICINE | Facility: CLINIC | Age: 45
End: 2025-07-29
Payer: MEDICAID

## 2025-07-29 ENCOUNTER — CLINICAL SUPPORT (OUTPATIENT)
Dept: REHABILITATION | Facility: HOSPITAL | Age: 45
End: 2025-07-29
Payer: MEDICAID

## 2025-07-29 VITALS
SYSTOLIC BLOOD PRESSURE: 132 MMHG | HEART RATE: 79 BPM | BODY MASS INDEX: 25.73 KG/M2 | HEIGHT: 73 IN | DIASTOLIC BLOOD PRESSURE: 72 MMHG

## 2025-07-29 DIAGNOSIS — Z09 SURGERY FOLLOW-UP EXAMINATION: ICD-10-CM

## 2025-07-29 DIAGNOSIS — Z98.890 S/P MEDIAL MENISCUS REPAIR OF LEFT KNEE: Primary | ICD-10-CM

## 2025-07-29 DIAGNOSIS — M25.562 LEFT KNEE PAIN, UNSPECIFIED CHRONICITY: Primary | ICD-10-CM

## 2025-07-29 DIAGNOSIS — M25.462 EFFUSION OF LEFT KNEE JOINT: ICD-10-CM

## 2025-07-29 PROCEDURE — 97014 ELECTRIC STIMULATION THERAPY: CPT | Mod: CQ

## 2025-07-29 PROCEDURE — 97110 THERAPEUTIC EXERCISES: CPT | Mod: CQ

## 2025-07-29 PROCEDURE — 20610 DRAIN/INJ JOINT/BURSA W/O US: CPT | Mod: PBBFAC,LT | Performed by: PHYSICIAN ASSISTANT

## 2025-07-29 PROCEDURE — 99999 PR PBB SHADOW E&M-EST. PATIENT-LVL III: CPT | Mod: PBBFAC,,, | Performed by: PHYSICIAN ASSISTANT

## 2025-07-29 PROCEDURE — 99213 OFFICE O/P EST LOW 20 MIN: CPT | Mod: PBBFAC | Performed by: PHYSICIAN ASSISTANT

## 2025-07-29 NOTE — PROCEDURES
Large Joint Aspiration/Injection: L knee    Date/Time: 7/29/2025 2:30 PM    Performed by: Alexandre Bro PA-C  Authorized by: Alexandre Bro PA-C    Site marked: the procedure site was marked    Timeout: prior to procedure the correct patient, procedure, and site was verified    Prep: patient was prepped and draped in usual sterile fashion      Local anesthesia used?: Yes    Local anesthetic: naropin 0.2%  Anesthetic total (ml):  10      Details:  Needle Size:  18 G  Ultrasonic Guidance for needle placement?: No    Approach:  Superior  Location:  Knee  Site:  L knee  Aspirate amount (mL):  55  Aspirate:  Blood-tinged  Patient tolerance:  Patient tolerated the procedure well with no immediate complications    Aspiration Procedure  A time out was performed, including verification of patient ID, procedure, site and side, availability of information and equipment, review of safety issues, and agreement with consent, the procedure site was marked.    After time out was performed, the patient was prepped aseptically with povidone-iodine swabsticks. Approximately 10 cc of 1% lidocaine plain was injected with a 25-gauge needle into the aspiration site without difficulty.  55cc's of serosanguineous joint fluid were aspirated from the Superolateral  aspect of the left Knee Joint using an 18g x 1.5 needle in sterile fashion without complication. Bandage was applied.     Enoc Rodney was reminded to rest with RICE for 48 hours post injection and to call the clinic immediately for any adverse side effects as explained in clinic today.

## 2025-07-29 NOTE — PROGRESS NOTES
Outpatient Rehab    Physical Therapy Visit    Patient Name: Enoc Rodney  MRN: [id number]  YOB: 1980  Encounter Date: 7/29/2025    Therapy Diagnosis:   Encounter Diagnosis   Name Primary?    Left knee pain, unspecified chronicity Yes     Physician: Alexandre Bro, EMILY    Physician Orders: Eval and Treat  Medical Diagnosis: Complex tear of medial meniscus, current injury, left knee, initial encounter  Surgical Diagnosis: L meniscus repair   Surgical Date: 7/14/2025  Days Since Last Surgery: 15    Visit # / Visits Authorized:  4 / 10  Insurance Authorization Period: 7/15/2025 to 9/9/2025  Date of Evaluation: 7/16/2025  Plan of Care Certification: 7/17/2025 to 10/3/2025      PT/PTA: PTA   Number of PTA visits since last PT visit:3  Time In: 1300   Time Out: 1425  Total Time (in minutes): 85   Total Billable Time (in minutes): 75    FOTO:  Intake Score (%): 30  Survey Score 2 (%): Not applicable for this Episode  Survey Score 3 (%): Not applicable for this Episode    Precautions:  Left Lower Extremity Weight-Bearing Status: Non-weight-bearing  Additional Precautions and Protocol Details: 0-90 degrees for 6 weeks, NWB, t-scope brace locked in extension for ambulationg      Subjective   Patient reports things are good, no new complaints. MD follow up today after PT.  Pain reported as 0/10. Left knee    Objective  Objective Measures updated at progress report unless specified.     Treatment:  *Per Medicaid guidelines all therapy billed as therex*  *Therapist one-on-one with patient for entire session with PT extender utilized for remainder of therapy session*    Therapeutic Exercise  TE 1: Education on prognosis, plan of care, home exercise program, exercise justification, involved anatomy, swelling/pain management  TE 2: NMES (portable unit): quad sets with heel prop: 10 minutes, SAQs with medium bolster: 10 minutes, SAQs with 1/2 foam: 10 minutes with strap, LAQs at EOM: 10 minutes with strap  TE 3:  SLR, SL Hip ABD: 2-3 x 10 reps each on Left LE  Modalities  Cryotherapy (Minutes\Location): cold pack for 10 minutes to Left knee post session    Time Entry(in minutes):  E-Stim (Unattended) Time Entry: 40  Hot/Cold Pack Time Entry: 10  Therapeutic Exercise Time Entry: 75    Assessment & Plan   Assessment: Enoc is 2 weeks, 1 days s/p Left meniscus repair. Presents ambulating with bilateral axillary crutches. He notes adequate muscle response with NMES. Cueing to maintain quad activation during SLR to prevent extensor lag. Addition of LAQs this session with good tolerance.  Evaluation/Treatment Tolerance: Patient tolerated treatment well    The patient will continue to benefit from skilled outpatient physical therapy in order to address the deficits listed in the problem list on the initial evaluation, provide patient and family education, and maximize the patients level of independence in the home and community environments.     The patient's spiritual, cultural, and educational needs were considered, and the patient is agreeable to the plan of care and goals.           Plan: Will continue per POC towards treatment goals. PT/PTA met face to face to discuss patient's treatment plan and progress towards established goals. Patient will be seen by physical therapist every sixth visit and minimally once per month.    Goals:   Active       Ambulation/movement       Patient will walk for 30 minutes with least restrictive device to demonstrate improved functional mobility (Progressing)       Start:  07/17/25    Expected End:  10/03/25               Functional outcome       Patient will show a significant change in FOTO patient-reported outcome tool to demonstrate subjective improvement (Progressing)       Start:  07/17/25    Expected End:  10/03/25            Patient stated goal: to return to prior level of function  (Progressing)       Start:  07/17/25    Expected End:  10/03/25            Patient will demonstrate  independence in home program for support of progression (Progressing)       Start:  07/17/25    Expected End:  09/05/25               Leisure activities       Patient will report swimming for 20 minutes with </= 3/10 pain (Progressing)       Start:  07/17/25    Expected End:  10/03/25               Pain       Patient will report a 2 point reduction in pain while performing PT exercises (Progressing)       Start:  07/17/25    Expected End:  09/05/25               Range of Motion       Patient will achieve left knee ROM of  0-130 degrees  (Progressing)       Start:  07/17/25    Expected End:  09/05/25                Paula Valdes, PTA

## 2025-07-29 NOTE — PROGRESS NOTES
"CC: Left knee scope post op 2 weeks    Patient is here for his 2 week post op appointment s/p below and is doing well. Patient is doing PT at [facility] and is progressing as expected. Patient is no longer taking pain medication. he denies any chest pain, SOB, fevers, chills, nausea, vomiting, or drainage from incision sites.     DATE OF PROCEDURE: 7/14/2025      PREOPERATIVE DIAGNOSES:   1. Left knee complex medial meniscus tear.      POSTOPERATIVE DIAGNOSES:   1. Left knee complex medial meniscus tear.      PROCEDURES:   1. Left knee arthroscopic medial meniscus repair (complex)  2. Left knee microfracture intercondylar notch     SURGEON: José Miguel Del Rosario M.D.     PE:    /72   Pulse 79   Ht 6' 1" (1.854 m)   BMI 25.73 kg/m²      Left knee:    Incisions clean/dry/intact  No sign of infection  Mild swelling  Compartments soft  Neurovascular status intact in extremity    PROM 0 to 90 degrees  Decreased quad strength  2+ effusion    Assessment:  2 weeks s/p left knee arthroscopic medial meniscus repair, microfracture intercondylar notch    Plan:  1.  Wounds healing well    2.  55 cc of serosanguineous fluid aspirated from the left knee.  See procedure note for details.    3.  Physical therapy for quad, ROM, modalities.  HEP for ROM, knee, VMO and hip abductor strengthening.     4.  Pain level acceptable for first post op visit.  Wean off pain medicine by next visit if still taking    5. Return to clinic in 4 weeks at 6 weeks post-op.      All questions were answered. Instructed patient to call with questions or concerns in the interim.     "

## 2025-07-31 ENCOUNTER — CLINICAL SUPPORT (OUTPATIENT)
Dept: REHABILITATION | Facility: HOSPITAL | Age: 45
End: 2025-07-31
Payer: MEDICAID

## 2025-07-31 DIAGNOSIS — M25.562 LEFT KNEE PAIN, UNSPECIFIED CHRONICITY: Primary | ICD-10-CM

## 2025-07-31 PROCEDURE — 97014 ELECTRIC STIMULATION THERAPY: CPT | Mod: CQ

## 2025-07-31 PROCEDURE — 97110 THERAPEUTIC EXERCISES: CPT | Mod: CQ

## 2025-07-31 NOTE — PROGRESS NOTES
Outpatient Rehab    Physical Therapy Visit    Patient Name: Enoc Rodney  MRN: [id number]  YOB: 1980  Encounter Date: 7/31/2025    Therapy Diagnosis:   Encounter Diagnosis   Name Primary?    Left knee pain, unspecified chronicity Yes     Physician: Alexandre Bro, EMILY    Physician Orders: Eval and Treat  Medical Diagnosis: Complex tear of medial meniscus, current injury, left knee, initial encounter  Surgical Diagnosis: L meniscus repair   Surgical Date: 7/14/2025  Days Since Last Surgery: 17    Visit # / Visits Authorized:  5 / 10  Insurance Authorization Period: 7/15/2025 to 9/9/2025  Date of Evaluation: 7/16/2025  Plan of Care Certification: 7/17/2025 to 10/3/2025      PT/PTA: PTA   Number of PTA visits since last PT visit:4  Time In: 1000   Time Out: 1110  Total Time (in minutes): 70   Total Billable Time (in minutes): 70    FOTO:  Intake Score (%): 30  Survey Score 2 (%): Not applicable for this Episode  Survey Score 3 (%): Not applicable for this Episode    Precautions:  Left Lower Extremity Weight-Bearing Status: Non-weight-bearing  Additional Precautions and Protocol Details: 0-90 degrees for 6 weeks, NWB, t-scope brace locked in extension for ambulationg      Subjective   Patient reports seeing the MD and having fluid drained from his knee. He was not given an update about the brace.  Pain reported as 0/10. Left knee    Objective  Objective Measures updated at progress report unless specified.     Treatment:  *Per Medicaid guidelines all therapy billed as therex*  *Therapist one-on-one with patient for entire session with PT extender utilized for remainder of therapy session*    Therapeutic Exercise  TE 1: Education on prognosis, plan of care, home exercise program, exercise justification, involved anatomy, swelling/pain management  TE 2: NMES (portable unit): quad sets with heel prop: 10 minutes, SAQs with medium bolster: 10 minutes, SAQs with 1/2 foam: 10 minutes with strap, LAQs at EOM:  10 minutes with strap  TE 3: SLR, SL Hip ABD: 2-3 x 10 reps each on Left LE      Time Entry(in minutes):  E-Stim (Unattended) Time Entry: 40  Therapeutic Exercise Time Entry: 70    Assessment & Plan   Assessment: Enoc is 2 weeks, 3 days s/p Left meniscus repair. Presents ambulating with bilateral axillary crutches. Continued use of NMES with good tolerance noting adequate muscle response throughout. Visible extensor lag present with SLR during fatigue, this improves with use of strap.   Evaluation/Treatment Tolerance: Patient tolerated treatment well    The patient will continue to benefit from skilled outpatient physical therapy in order to address the deficits listed in the problem list on the initial evaluation, provide patient and family education, and maximize the patients level of independence in the home and community environments.     The patient's spiritual, cultural, and educational needs were considered, and the patient is agreeable to the plan of care and goals.           Plan: Will continue per POC towards treatment goals. PT/PTA met face to face to discuss patient's treatment plan and progress towards established goals. Patient will be seen by physical therapist every sixth visit and minimally once per month.    Goals:   Active       Ambulation/movement       Patient will walk for 30 minutes with least restrictive device to demonstrate improved functional mobility (Progressing)       Start:  07/17/25    Expected End:  10/03/25               Functional outcome       Patient will show a significant change in FOTO patient-reported outcome tool to demonstrate subjective improvement (Progressing)       Start:  07/17/25    Expected End:  10/03/25            Patient stated goal: to return to prior level of function  (Progressing)       Start:  07/17/25    Expected End:  10/03/25            Patient will demonstrate independence in home program for support of progression (Progressing)       Start:  07/17/25     Expected End:  09/05/25               Leisure activities       Patient will report swimming for 20 minutes with </= 3/10 pain (Progressing)       Start:  07/17/25    Expected End:  10/03/25               Pain       Patient will report a 2 point reduction in pain while performing PT exercises (Progressing)       Start:  07/17/25    Expected End:  09/05/25               Range of Motion       Patient will achieve left knee ROM of  0-130 degrees  (Progressing)       Start:  07/17/25    Expected End:  09/05/25                Paula Valdes, PTA

## 2025-08-04 ENCOUNTER — CLINICAL SUPPORT (OUTPATIENT)
Dept: REHABILITATION | Facility: HOSPITAL | Age: 45
End: 2025-08-04
Payer: MEDICAID

## 2025-08-04 DIAGNOSIS — M25.562 LEFT KNEE PAIN, UNSPECIFIED CHRONICITY: Primary | ICD-10-CM

## 2025-08-04 PROCEDURE — 97110 THERAPEUTIC EXERCISES: CPT

## 2025-08-05 NOTE — PROGRESS NOTES
Outpatient Rehab    Physical Therapy Visit    Patient Name: Enoc Rodney  MRN: [id number]  YOB: 1980  Encounter Date: 8/4/2025    Therapy Diagnosis:   Encounter Diagnosis   Name Primary?    Left knee pain, unspecified chronicity Yes     Physician: Alexandre Bro, EMILY    Physician Orders: Eval and Treat  Medical Diagnosis: Complex tear of medial meniscus, current injury, left knee, initial encounter  Surgical Diagnosis: L meniscus repair   Surgical Date: 7/14/2025  Days Since Last Surgery: 22    Visit # / Visits Authorized:  6 / 10  Insurance Authorization Period: 7/15/2025 to 9/9/2025  Date of Evaluation: 7/16/2025  Plan of Care Certification: 7/17/2025 to 10/3/2025      PT/PTA: PT   Number of PTA visits since last PT visit:0  Time In: 0905   Time Out: 1014  Total Time (in minutes): 69   Total Billable Time (in minutes):  68    FOTO:  Intake Score (%): 30  Survey Score 2 (%): Not applicable for this Episode  Survey Score 3 (%): Not applicable for this Episode    Precautions:  Left Lower Extremity Weight-Bearing Status: Non-weight-bearing  Additional Precautions and Protocol Details: 0-90 degrees for 6 weeks, NWB, t-scope brace locked in extension for ambulationg      Subjective   Patient reports performing core and upper body exercises outside of PT. No new pain in knee..         Objective            Treatment:  Therapeutic Exercise  TE 1: Education on prognosis, plan of care, home exercise program, exercise justification, involved anatomy, swelling/pain management  TE 2: NMES (portable unit): quad sets with hyperextension: 11 minutes, SAQs with medium bolster: 12 minutes, SAQs with 1/2 foam: 12 minutes, LAQs at EOM: 10 minutes  TE 4: Standing 3-way hips with brace donned: 2 x 5 reps standing on RLE only      Time Entry(in minutes):  Therapeutic Exercise Time Entry: 69    Assessment & Plan   Assessment: Enoc is 3 weeks, 0 days s/p Left meniscus repair. Presents ambulating with bilateral axillary  crutches. Continued use of NMES with good tolerance. Able to achieve quad set with hyperextension in left lower extremity, but reports mild discomfort. PT provided education on importance of quad activation through full range of extension. Will continue to progress as tolerated.  Evaluation/Treatment Tolerance: Patient tolerated treatment well    The patient will continue to benefit from skilled outpatient physical therapy in order to address the deficits listed in the problem list on the initial evaluation, provide patient and family education, and maximize the patients level of independence in the home and community environments.     The patient's spiritual, cultural, and educational needs were considered, and the patient is agreeable to the plan of care and goals.           Plan: Start 25-50% WB in week 4    Goals:   Active       Ambulation/movement       Patient will walk for 30 minutes with least restrictive device to demonstrate improved functional mobility (Ongoing)       Start:  07/17/25    Expected End:  10/03/25               Functional outcome       Patient will show a significant change in FOTO patient-reported outcome tool to demonstrate subjective improvement (Ongoing)       Start:  07/17/25    Expected End:  10/03/25            Patient stated goal: to return to prior level of function  (Ongoing)       Start:  07/17/25    Expected End:  10/03/25            Patient will demonstrate independence in home program for support of progression (Ongoing)       Start:  07/17/25    Expected End:  09/05/25               Leisure activities       Patient will report swimming for 20 minutes with </= 3/10 pain (Ongoing)       Start:  07/17/25    Expected End:  10/03/25               Pain       Patient will report a 2 point reduction in pain while performing PT exercises (Ongoing)       Start:  07/17/25    Expected End:  09/05/25               Range of Motion       Patient will achieve left knee ROM of  0-130 degrees   (Ongoing)       Start:  07/17/25    Expected End:  09/05/25                Devorah Gregg PT, DPT

## 2025-08-06 ENCOUNTER — CLINICAL SUPPORT (OUTPATIENT)
Dept: REHABILITATION | Facility: HOSPITAL | Age: 45
End: 2025-08-06
Payer: MEDICAID

## 2025-08-06 DIAGNOSIS — M25.562 LEFT KNEE PAIN, UNSPECIFIED CHRONICITY: Primary | ICD-10-CM

## 2025-08-06 PROCEDURE — 97014 ELECTRIC STIMULATION THERAPY: CPT

## 2025-08-06 PROCEDURE — 97110 THERAPEUTIC EXERCISES: CPT

## 2025-08-07 NOTE — PROGRESS NOTES
Outpatient Rehab    Physical Therapy Visit    Patient Name: Enoc Rodney  MRN: [id number]  YOB: 1980  Encounter Date: 8/6/2025    Therapy Diagnosis:   Encounter Diagnosis   Name Primary?    Left knee pain, unspecified chronicity Yes     Physician: Alexandre Bro PA-C    Physician Orders: Eval and Treat  Medical Diagnosis: Complex tear of medial meniscus, current injury, left knee, initial encounter  Surgical Diagnosis: L meniscus repair   Surgical Date: 7/14/2025  Days Since Last Surgery: 24    Visit # / Visits Authorized:  7 / 10  Insurance Authorization Period: 7/15/2025 to 9/9/2025  Date of Evaluation: 7/16/2025  Plan of Care Certification: 7/17/2025 to 10/3/2025      PT/PTA: PT   Number of PTA visits since last PT visit:0  Time In: 1005   Time Out: 1110  Total Time (in minutes): 65   Total Billable Time (in minutes):  65    FOTO:  Intake Score (%): 30  Survey Score 2 (%): Not applicable for this Episode  Survey Score 3 (%): Not applicable for this Episode    Precautions:  Left Lower Extremity Weight-Bearing Status: Non-weight-bearing  Additional Precautions and Protocol Details: 0-90 degrees for 6 weeks, NWB, t-scope brace locked in extension for ambulationg      Subjective   Patietn reports that he had increased pain after getting out of his car with brace on. He is not sure if it was locked or unlocked. He reports not bearing weight through the leg..         Objective            Treatment:  Therapeutic Exercise  TE 1: Education on prognosis, plan of care, home exercise program, exercise justification, involved anatomy, swelling/pain management  TE 2: NMES (portable unit): quad sets with hyperextension: 13 minutes, SAQs with medium bolster: 12 minutes, SAQs with 1/2 foam: 12 minutes, LAQs at EOM: 9 minutes  TE 4: Standing 3-way hips with brace donned: 3 x 5 reps standing on RLE only      Time Entry(in minutes):  Therapeutic Exercise Time Entry: 65    Assessment & Plan   Assessment: Enoc is 3  weeks, 2 days s/p Left meniscus repair. Presents ambulating with bilateral axillary crutches. Continued use of NMES with good tolerance. Patient reported slight irritation in left knee after getting out of his car yesterday. Swelling is as expected at this point post op. He is able to demonstrate good quad isometric activation and full extension. Will continue to progress as tolerated.       The patient will continue to benefit from skilled outpatient physical therapy in order to address the deficits listed in the problem list on the initial evaluation, provide patient and family education, and maximize the patients level of independence in the home and community environments.     The patient's spiritual, cultural, and educational needs were considered, and the patient is agreeable to the plan of care and goals.           Plan: Progress to 25-50% WB in PT only    Goals:   Active       Ambulation/movement       Patient will walk for 30 minutes with least restrictive device to demonstrate improved functional mobility (Ongoing)       Start:  07/17/25    Expected End:  10/03/25               Functional outcome       Patient will show a significant change in FOTO patient-reported outcome tool to demonstrate subjective improvement (Ongoing)       Start:  07/17/25    Expected End:  10/03/25            Patient stated goal: to return to prior level of function  (Ongoing)       Start:  07/17/25    Expected End:  10/03/25            Patient will demonstrate independence in home program for support of progression (Ongoing)       Start:  07/17/25    Expected End:  09/05/25               Leisure activities       Patient will report swimming for 20 minutes with </= 3/10 pain (Ongoing)       Start:  07/17/25    Expected End:  10/03/25               Pain       Patient will report a 2 point reduction in pain while performing PT exercises (Ongoing)       Start:  07/17/25    Expected End:  09/05/25               Range of Motion        Patient will achieve left knee ROM of  0-130 degrees  (Ongoing)       Start:  07/17/25    Expected End:  09/05/25                Devorah Gregg PT, DPT

## 2025-08-11 ENCOUNTER — CLINICAL SUPPORT (OUTPATIENT)
Dept: REHABILITATION | Facility: HOSPITAL | Age: 45
End: 2025-08-11
Payer: MEDICAID

## 2025-08-11 DIAGNOSIS — M25.562 LEFT KNEE PAIN, UNSPECIFIED CHRONICITY: Primary | ICD-10-CM

## 2025-08-11 PROCEDURE — 97110 THERAPEUTIC EXERCISES: CPT

## 2025-08-13 ENCOUNTER — CLINICAL SUPPORT (OUTPATIENT)
Dept: REHABILITATION | Facility: HOSPITAL | Age: 45
End: 2025-08-13
Payer: MEDICAID

## 2025-08-13 DIAGNOSIS — M25.562 LEFT KNEE PAIN, UNSPECIFIED CHRONICITY: Primary | ICD-10-CM

## 2025-08-13 PROCEDURE — 97110 THERAPEUTIC EXERCISES: CPT | Mod: CQ

## 2025-08-13 PROCEDURE — 97014 ELECTRIC STIMULATION THERAPY: CPT | Mod: CQ

## 2025-08-18 ENCOUNTER — CLINICAL SUPPORT (OUTPATIENT)
Dept: REHABILITATION | Facility: HOSPITAL | Age: 45
End: 2025-08-18
Payer: MEDICAID

## 2025-08-18 DIAGNOSIS — M25.562 LEFT KNEE PAIN, UNSPECIFIED CHRONICITY: Primary | ICD-10-CM

## 2025-08-18 PROCEDURE — 97014 ELECTRIC STIMULATION THERAPY: CPT | Mod: CQ

## 2025-08-18 PROCEDURE — 97110 THERAPEUTIC EXERCISES: CPT | Mod: CQ

## 2025-08-20 ENCOUNTER — CLINICAL SUPPORT (OUTPATIENT)
Dept: REHABILITATION | Facility: HOSPITAL | Age: 45
End: 2025-08-20
Payer: MEDICAID

## 2025-08-20 DIAGNOSIS — M25.562 LEFT KNEE PAIN, UNSPECIFIED CHRONICITY: Primary | ICD-10-CM

## 2025-08-20 PROCEDURE — 97110 THERAPEUTIC EXERCISES: CPT | Mod: CQ

## 2025-08-20 PROCEDURE — 97014 ELECTRIC STIMULATION THERAPY: CPT | Mod: CQ

## 2025-08-26 ENCOUNTER — OFFICE VISIT (OUTPATIENT)
Dept: SPORTS MEDICINE | Facility: CLINIC | Age: 45
End: 2025-08-26
Payer: MEDICAID

## 2025-08-26 ENCOUNTER — CLINICAL SUPPORT (OUTPATIENT)
Dept: REHABILITATION | Facility: HOSPITAL | Age: 45
End: 2025-08-26
Payer: MEDICAID

## 2025-08-26 VITALS
SYSTOLIC BLOOD PRESSURE: 118 MMHG | HEART RATE: 76 BPM | WEIGHT: 195.75 LBS | DIASTOLIC BLOOD PRESSURE: 70 MMHG | BODY MASS INDEX: 25.94 KG/M2 | HEIGHT: 73 IN

## 2025-08-26 DIAGNOSIS — S83.232A COMPLEX TEAR OF MEDIAL MENISCUS OF LEFT KNEE, UNSPECIFIED WHETHER OLD OR CURRENT TEAR, INITIAL ENCOUNTER: ICD-10-CM

## 2025-08-26 DIAGNOSIS — M25.562 LEFT KNEE PAIN, UNSPECIFIED CHRONICITY: Primary | ICD-10-CM

## 2025-08-26 DIAGNOSIS — Z98.890 S/P MEDIAL MENISCUS REPAIR OF LEFT KNEE: Primary | ICD-10-CM

## 2025-08-26 DIAGNOSIS — M25.462 EFFUSION OF LEFT KNEE: ICD-10-CM

## 2025-08-26 PROCEDURE — 20610 DRAIN/INJ JOINT/BURSA W/O US: CPT | Mod: PBBFAC,LT | Performed by: ORTHOPAEDIC SURGERY

## 2025-08-26 PROCEDURE — 99213 OFFICE O/P EST LOW 20 MIN: CPT | Mod: PBBFAC | Performed by: ORTHOPAEDIC SURGERY

## 2025-08-26 PROCEDURE — 97110 THERAPEUTIC EXERCISES: CPT

## 2025-08-26 PROCEDURE — 99999 PR PBB SHADOW E&M-EST. PATIENT-LVL III: CPT | Mod: PBBFAC,,, | Performed by: ORTHOPAEDIC SURGERY

## 2025-08-28 ENCOUNTER — TELEPHONE (OUTPATIENT)
Dept: SPORTS MEDICINE | Facility: CLINIC | Age: 45
End: 2025-08-28
Payer: MEDICAID

## 2025-08-28 DIAGNOSIS — Z98.890 S/P MEDIAL MENISCUS REPAIR OF LEFT KNEE: Primary | ICD-10-CM

## 2025-08-28 RX ORDER — IBUPROFEN 800 MG/1
800 TABLET, FILM COATED ORAL 3 TIMES DAILY
Qty: 90 TABLET | Refills: 0 | Status: SHIPPED | OUTPATIENT
Start: 2025-08-28

## 2025-08-29 ENCOUNTER — CLINICAL SUPPORT (OUTPATIENT)
Dept: REHABILITATION | Facility: HOSPITAL | Age: 45
End: 2025-08-29
Payer: MEDICAID

## 2025-08-29 DIAGNOSIS — M25.562 LEFT KNEE PAIN, UNSPECIFIED CHRONICITY: Primary | ICD-10-CM

## 2025-08-29 PROCEDURE — 97110 THERAPEUTIC EXERCISES: CPT

## 2025-09-01 PROBLEM — M25.562 LEFT KNEE PAIN: Status: RESOLVED | Noted: 2025-07-17 | Resolved: 2025-09-01

## 2025-09-02 ENCOUNTER — CLINICAL SUPPORT (OUTPATIENT)
Dept: REHABILITATION | Facility: HOSPITAL | Age: 45
End: 2025-09-02
Payer: MEDICAID

## 2025-09-02 DIAGNOSIS — M25.562 LEFT KNEE PAIN, UNSPECIFIED CHRONICITY: Primary | ICD-10-CM

## 2025-09-02 PROCEDURE — 97110 THERAPEUTIC EXERCISES: CPT

## 2025-09-04 ENCOUNTER — CLINICAL SUPPORT (OUTPATIENT)
Dept: REHABILITATION | Facility: HOSPITAL | Age: 45
End: 2025-09-04
Payer: MEDICAID

## 2025-09-04 DIAGNOSIS — M25.562 LEFT KNEE PAIN, UNSPECIFIED CHRONICITY: Primary | ICD-10-CM

## 2025-09-04 PROCEDURE — 97110 THERAPEUTIC EXERCISES: CPT | Mod: CQ

## (undated) DEVICE — TOURNIQUET SB QC DP 34X4IN

## (undated) DEVICE — DRAPE STERI U-SHAPED 47X51IN

## (undated) DEVICE — BLADE SHAVER LANZA 4.2X13CM

## (undated) DEVICE — DRAPE ARTHSCP FLD CTRL POUCH

## (undated) DEVICE — SUT MONOCRYL 4-0 PS-2

## (undated) DEVICE — GLOVE SENSICARE PI SURG 7

## (undated) DEVICE — GLOVE BIOGEL SKINSENSE PI 8.0

## (undated) DEVICE — PAD ELECTRODE STER 1.5X3

## (undated) DEVICE — SOL NACL IRR 1000ML BTL

## (undated) DEVICE — Device

## (undated) DEVICE — UNDERGLOVES BIOGEL PI SIZE 8

## (undated) DEVICE — PAD ABDOMINAL STERILE 8X10IN

## (undated) DEVICE — CUTTER SUT KNOT PUSH PRTL SKID

## (undated) DEVICE — SOL NACL IRR 3000ML

## (undated) DEVICE — DRAPE STERI INSTRUMENT 1018

## (undated) DEVICE — BNDG COFLEX FOAM LF2 ST 6X5YD

## (undated) DEVICE — DRAPE TOP 53X102IN

## (undated) DEVICE — DRESSING XEROFORM NONADH 1X8IN

## (undated) DEVICE — BRACE KNEE T SCOPE PREMIER

## (undated) DEVICE — WRAP KNEE ACCU THERM GEL PACK

## (undated) DEVICE — GLOVE SENSICARE PI GRN 7

## (undated) DEVICE — CLOSURE SKIN STERI STRIP 1/2X4

## (undated) DEVICE — SPONGE COTTON TRAY 4X4IN

## (undated) DEVICE — COVER CAMERA OPERATING ROOM

## (undated) DEVICE — GOWN ECLIPSE REINF LV4 XLNG XL

## (undated) DEVICE — ADHESIVE MASTISOL VIAL 48/BX